# Patient Record
Sex: FEMALE | Race: WHITE | NOT HISPANIC OR LATINO | Employment: PART TIME | URBAN - NONMETROPOLITAN AREA
[De-identification: names, ages, dates, MRNs, and addresses within clinical notes are randomized per-mention and may not be internally consistent; named-entity substitution may affect disease eponyms.]

---

## 2018-02-23 ENCOUNTER — APPOINTMENT (OUTPATIENT)
Dept: LAB | Facility: HOSPITAL | Age: 58
End: 2018-02-23
Attending: EMERGENCY MEDICINE

## 2018-02-23 ENCOUNTER — HOSPITAL ENCOUNTER (OUTPATIENT)
Dept: CT IMAGING | Facility: HOSPITAL | Age: 58
Discharge: HOME | End: 2018-02-23
Admitting: EMERGENCY MEDICINE

## 2018-02-23 PROCEDURE — 70450 CT HEAD/BRAIN W/O DYE: CPT

## 2018-02-23 PROCEDURE — 80053 COMPREHEN METABOLIC PANEL: CPT | Performed by: EMERGENCY MEDICINE

## 2018-02-23 PROCEDURE — 85025 COMPLETE CBC W/AUTO DIFF WBC: CPT | Performed by: EMERGENCY MEDICINE

## 2018-10-22 ENCOUNTER — HOSPITAL ENCOUNTER (OUTPATIENT)
Facility: HOSPITAL | Age: 58
Setting detail: HOSPITAL OUTPATIENT SURGERY
Discharge: HOME | End: 2018-10-22
Attending: INTERNAL MEDICINE | Admitting: INTERNAL MEDICINE

## 2018-10-22 ENCOUNTER — ANESTHESIA (OUTPATIENT)
Dept: GASTROENTEROLOGY | Facility: HOSPITAL | Age: 58
End: 2018-10-22

## 2018-10-22 ENCOUNTER — ANESTHESIA EVENT (OUTPATIENT)
Dept: GASTROENTEROLOGY | Facility: HOSPITAL | Age: 58
End: 2018-10-22

## 2018-10-22 VITALS
TEMPERATURE: 97 F | SYSTOLIC BLOOD PRESSURE: 115 MMHG | BODY MASS INDEX: 31.78 KG/M2 | WEIGHT: 222 LBS | HEIGHT: 70 IN | DIASTOLIC BLOOD PRESSURE: 66 MMHG | RESPIRATION RATE: 20 BRPM | OXYGEN SATURATION: 94 % | HEART RATE: 68 BPM

## 2018-10-22 DIAGNOSIS — Z86.010 HISTORY OF COLON POLYPS: ICD-10-CM

## 2018-10-22 PROCEDURE — 25010000002 CEFTRIAXONE: Performed by: INTERNAL MEDICINE

## 2018-10-22 PROCEDURE — 25010000002 MIDAZOLAM PER 1 MG: Performed by: NURSE ANESTHETIST, CERTIFIED REGISTERED

## 2018-10-22 PROCEDURE — 25010000002 PROPOFOL 10 MG/ML EMULSION: Performed by: NURSE ANESTHETIST, CERTIFIED REGISTERED

## 2018-10-22 PROCEDURE — 88305 TISSUE EXAM BY PATHOLOGIST: CPT | Performed by: INTERNAL MEDICINE

## 2018-10-22 PROCEDURE — 88305 TISSUE EXAM BY PATHOLOGIST: CPT | Performed by: PATHOLOGY

## 2018-10-22 RX ORDER — GABAPENTIN 100 MG/1
100 CAPSULE ORAL 3 TIMES DAILY
COMMUNITY
End: 2022-12-09

## 2018-10-22 RX ORDER — PROMETHAZINE HYDROCHLORIDE 25 MG/ML
12.5 INJECTION, SOLUTION INTRAMUSCULAR; INTRAVENOUS ONCE AS NEEDED
Status: DISCONTINUED | OUTPATIENT
Start: 2018-10-22 | End: 2018-10-22 | Stop reason: HOSPADM

## 2018-10-22 RX ORDER — PROMETHAZINE HYDROCHLORIDE 25 MG/1
25 TABLET ORAL ONCE AS NEEDED
Status: DISCONTINUED | OUTPATIENT
Start: 2018-10-22 | End: 2018-10-22 | Stop reason: HOSPADM

## 2018-10-22 RX ORDER — MIDAZOLAM HYDROCHLORIDE 1 MG/ML
INJECTION INTRAMUSCULAR; INTRAVENOUS AS NEEDED
Status: DISCONTINUED | OUTPATIENT
Start: 2018-10-22 | End: 2018-10-22 | Stop reason: SURG

## 2018-10-22 RX ORDER — ONDANSETRON 2 MG/ML
4 INJECTION INTRAMUSCULAR; INTRAVENOUS ONCE AS NEEDED
Status: DISCONTINUED | OUTPATIENT
Start: 2018-10-22 | End: 2018-10-22 | Stop reason: HOSPADM

## 2018-10-22 RX ORDER — DEXTROSE AND SODIUM CHLORIDE 5; .45 G/100ML; G/100ML
20 INJECTION, SOLUTION INTRAVENOUS CONTINUOUS
Status: DISCONTINUED | OUTPATIENT
Start: 2018-10-22 | End: 2018-10-22 | Stop reason: HOSPADM

## 2018-10-22 RX ORDER — DEXAMETHASONE SODIUM PHOSPHATE 4 MG/ML
8 INJECTION, SOLUTION INTRA-ARTICULAR; INTRALESIONAL; INTRAMUSCULAR; INTRAVENOUS; SOFT TISSUE ONCE AS NEEDED
Status: DISCONTINUED | OUTPATIENT
Start: 2018-10-22 | End: 2018-10-22 | Stop reason: HOSPADM

## 2018-10-22 RX ORDER — ESCITALOPRAM OXALATE 10 MG/1
10 TABLET ORAL DAILY
COMMUNITY

## 2018-10-22 RX ORDER — MONTELUKAST SODIUM 10 MG/1
10 TABLET ORAL NIGHTLY
COMMUNITY
End: 2022-12-09

## 2018-10-22 RX ORDER — PROMETHAZINE HYDROCHLORIDE 25 MG/1
25 SUPPOSITORY RECTAL ONCE AS NEEDED
Status: DISCONTINUED | OUTPATIENT
Start: 2018-10-22 | End: 2018-10-22 | Stop reason: HOSPADM

## 2018-10-22 RX ORDER — PROPOFOL 10 MG/ML
VIAL (ML) INTRAVENOUS AS NEEDED
Status: DISCONTINUED | OUTPATIENT
Start: 2018-10-22 | End: 2018-10-22 | Stop reason: SURG

## 2018-10-22 RX ADMIN — PROPOFOL 50 MG: 10 INJECTION, EMULSION INTRAVENOUS at 16:03

## 2018-10-22 RX ADMIN — CEFTRIAXONE 1 G: 1 INJECTION, POWDER, FOR SOLUTION INTRAMUSCULAR; INTRAVENOUS at 15:54

## 2018-10-22 RX ADMIN — PROPOFOL 50 MG: 10 INJECTION, EMULSION INTRAVENOUS at 15:59

## 2018-10-22 RX ADMIN — PROPOFOL 50 MG: 10 INJECTION, EMULSION INTRAVENOUS at 15:53

## 2018-10-22 RX ADMIN — PROPOFOL 30 MG: 10 INJECTION, EMULSION INTRAVENOUS at 16:06

## 2018-10-22 RX ADMIN — DEXTROSE AND SODIUM CHLORIDE 20 ML/HR: 5; 450 INJECTION, SOLUTION INTRAVENOUS at 15:47

## 2018-10-22 RX ADMIN — PROPOFOL 50 MG: 10 INJECTION, EMULSION INTRAVENOUS at 15:55

## 2018-10-22 RX ADMIN — MIDAZOLAM HYDROCHLORIDE 2 MG: 2 INJECTION, SOLUTION INTRAMUSCULAR; INTRAVENOUS at 15:50

## 2018-10-22 NOTE — ANESTHESIA POSTPROCEDURE EVALUATION
Patient: Mary Alice Garcia    Procedure Summary     Date:  10/22/18 Room / Location:  [facility] ENDOSCOPY 2 / [facility] ENDOSCOPY    Anesthesia Start:  1550 Anesthesia Stop:  1609    Procedure:  COLONOSCOPY (N/A ) Diagnosis:       History of colon polyps      (History of colon polyps [Z86.010])    Surgeon:  Christiano Durbin DO Provider:  Ethel Washburn CRNA    Anesthesia Type:  MAC ASA Status:  3          Anesthesia Type: MAC  Last vitals  BP   143/76 (10/22/18 1538)   Temp   97.4 °F (36.3 °C) (10/22/18 1538)   Pulse   76 (10/22/18 1538)   Resp   20 (10/22/18 1538)     SpO2   99 % (10/22/18 1538)     Post Anesthesia Care and Evaluation    Patient location during evaluation: bedside  Patient participation: complete - patient participated  Level of consciousness: awake and awake and alert  Pain management: adequate  Airway patency: patent  Anesthetic complications: No anesthetic complications  PONV Status: none  Cardiovascular status: acceptable  Respiratory status: acceptable  Hydration status: acceptable

## 2018-10-22 NOTE — ANESTHESIA PREPROCEDURE EVALUATION
Anesthesia Evaluation     Patient summary reviewed and Nursing notes reviewed                Airway   Mallampati: III  TM distance: >3 FB  Neck ROM: full  Possible difficult intubation  Dental - normal exam     Pulmonary - negative pulmonary ROS   (+) decreased breath sounds,   Cardiovascular - negative cardio ROS and normal exam        Neuro/Psych  (+) seizures, psychiatric history Anxiety and Bipolar,     GI/Hepatic/Renal/Endo    (+) obesity,  GERD,      Musculoskeletal     Abdominal   (+) obese,    Substance History - negative use     OB/GYN negative ob/gyn ROS         Other   (+) arthritis                     Anesthesia Plan    ASA 3     MAC     Anesthetic plan, all risks, benefits, and alternatives have been provided, discussed and informed consent has been obtained with: patient.

## 2018-10-23 NOTE — H&P
Asad Hurtado DO,[facility]  Gastroenterology  Hepatology  Endoscopy  Board Certified in Internal Medicine and gastroenterology  [address], [address]  [place]. [address]  T- [phone]   F - [phone]     GASTROENTEROLOGY HISTORY AND PHYSICAL  NOTE   ASAD HURTADO DO.         SUBJECTIVE:   10/23/2018    Name: Mary Alice Garcia  DOD: 1960            Subjective : History of colon polyps.    Patient is 58 y.o. female presents with desire for elective colonoscopy.      ROS/HISTORY/ CURRENT MEDICATIONS/OBJECTIVE/VS/PE:   Review of Systems:  All systems unremarkable unless specified below.  Constitutional   HENT  Eyes   Respiratory    Cardiovascular  Gastrointestinal   Endocrine  Genitourinary    Musculoskeletal   Skin  Allergic/Immunologic    Neurological    Hematological  Psychiatric/Behavioral    History:   History reviewed. No pertinent past medical history.  Past Surgical History:   Procedure Laterality Date   •  SECTION     • COLONOSCOPY N/A 10/22/2018    Procedure: COLONOSCOPY;  Surgeon: Asad Hurtado DO;  Location: [facility] ENDOSCOPY;  Service: Gastroenterology   • JOINT REPLACEMENT Left     knee   • LAPAROSCOPIC GASTRIC BANDING     • REPLACEMENT TOTAL KNEE     • TUBAL ABDOMINAL LIGATION       History reviewed. No pertinent family history.  Social History   Substance Use Topics   • Smoking status: Not on file   • Smokeless tobacco: Not on file   • Alcohol use Not on file     No prescriptions prior to admission.     Allergies:  Patient has no known allergies.    I have reviewed the patients medical history, surgical history and family history in the available medical record system.     Current Medications:     No current facility-administered medications for this encounter.      Current Outpatient Prescriptions   Medication Sig Dispense Refill   • Cyanocobalamin (VITAMIN B-12 IJ) Inject 1,000 mcg as directed Every 28 (Twenty-Eight) Days.     • escitalopram (LEXAPRO) 10 MG  tablet Take 10 mg by mouth Daily.     • gabapentin (NEURONTIN) 100 MG capsule Take 100 mg by mouth 3 (Three) Times a Day.     • montelukast (SINGULAIR) 10 MG tablet Take 10 mg by mouth Every Night.     • aspirin 81 MG EC tablet Take 81 mg by mouth Daily.     • Cholecalciferol 5000 units tablet Take 5,000 Units by mouth.     • lamoTRIgine (LaMICtal) 200 MG tablet Take 200 mg by mouth.     • Oxybutynin Chloride (DITROPAN PO) Take 5 mg by mouth.     • pregabalin (LYRICA) 75 MG capsule      • raNITIdine (ZANTAC) 150 MG tablet Take 300 mg by mouth.         Objective     Physical Exam:   Temp:  [97 °F (36.1 °C)-97.4 °F (36.3 °C)] 97 °F (36.1 °C)  Heart Rate:  [67-76] 68  Resp:  [18-20] 20  BP: (115-143)/(66-76) 115/66    Physical Exam:  General Appearance:    Alert, cooperative, in no acute distress   Head:    Normocephalic, without obvious abnormality, atraumatic   Eyes:            Lids and lashes normal, conjunctivae and sclerae normal, no   icterus, no pallor, corneas clear, PERRLA   Ears:    Ears appear intact with no abnormalities noted   Throat:   No oral lesions, no thrush, oral mucosa moist   Neck:   No adenopathy, supple, trachea midline, no thyromegaly, no     carotid bruit, no JVD   Back:     No kyphosis present, no scoliosis present, no skin lesions,       erythema or scars, no tenderness to percussion or                   palpation,   range of motion normal   Lungs:     Clear to auscultation,respirations regular, even and                   unlabored    Heart:    Regular rhythm and normal rate, normal S1 and S2, no            murmur, no gallop, no rub, no click   Breast Exam:    Deferred   Abdomen:     Normal bowel sounds, no masses, no organomegaly, soft        non-tender, non-distended, no guarding, no rebound                 tenderness   Genitalia:    Deferred   Extremities:   Moves all extremities well, no edema, no cyanosis, no              redness   Pulses:   Pulses palpable and equal bilaterally   Skin:    No bleeding, bruising or rash   Lymph nodes:   No palpable adenopathy   Neurologic:   Cranial nerves 2 - 12 grossly intact, sensation intact, DTR        present and equal bilaterally      Results Review:     Lab Results   Component Value Date    WBC 6.06 02/23/2018    HGB 13.8 02/23/2018    HCT 41.9 02/23/2018     02/23/2018             No results found for: LIPASE  No results found for: INR       Radiology Review:  Imaging Results (last 72 hours)     ** No results found for the last 72 hours. **           I reviewed the patient's new clinical results.  I reviewed the patient's new imaging results and agree with the interpretation.     ASSESSMENT/PLAN:   ASSESSMENT:   1.  Personal history of colon polyps    PLAN:   1.  Colonoscopy    Risk and benefits associated with procedure are reviewed with the patient.  The patient wishes to proceed      Christiano Durbin DO  10/23/18  11:23 AM

## 2018-10-24 LAB
LAB AP CASE REPORT: NORMAL
PATH REPORT.FINAL DX SPEC: NORMAL
PATH REPORT.GROSS SPEC: NORMAL

## 2022-06-13 ENCOUNTER — TRANSCRIBE ORDERS (OUTPATIENT)
Dept: PHYSICAL THERAPY | Facility: HOSPITAL | Age: 62
End: 2022-06-13

## 2022-06-13 DIAGNOSIS — Z96.651 STATUS POST TOTAL KNEE REPLACEMENT, RIGHT: Primary | ICD-10-CM

## 2022-06-29 ENCOUNTER — HOSPITAL ENCOUNTER (OUTPATIENT)
Dept: PHYSICAL THERAPY | Facility: HOSPITAL | Age: 62
Setting detail: THERAPIES SERIES
Discharge: HOME | End: 2022-06-29

## 2022-06-29 DIAGNOSIS — Z96.651 STATUS POST TOTAL KNEE REPLACEMENT, RIGHT: Primary | ICD-10-CM

## 2022-06-29 PROCEDURE — 97162 PT EVAL MOD COMPLEX 30 MIN: CPT

## 2022-06-29 NOTE — THERAPY EVALUATION
Outpatient Physical Therapy Ortho Initial Evaluation  [facility]     Patient Name: Mary Alice Garcia  : 1960  MRN: [id number]  Today's Date: 2022      Visit Date: 2022   Attendance:  (Awaiting approved)  Subjective Improvement: n/a  Next MD Visit: ~ 3 weeks  Recert Date: 22    Therapy Diagnosis: S/P R TKA on 22      Patient Active Problem List   Diagnosis   • Agoraphobia   • Arthropathy, multiple sites   • Bipolar disorder (HCC)   • Depressive disorder, not elsewhere classified   • Esophageal reflux   • Generalized osteoarthritis of multiple sites   • Mood disorder (HCC)   • Obesity   • Sialoadenitis   • Thoracic or lumbosacral neuritis or radiculitis, unspecified   • Venous insufficiency   • Vitamin B 12 deficiency        History reviewed. No pertinent past medical history.     Past Surgical History:   Procedure Laterality Date   •  SECTION     • COLONOSCOPY N/A 10/22/2018    Procedure: COLONOSCOPY;  Surgeon: Christiano Durbin DO;  Location: [facility] ENDOSCOPY;  Service: Gastroenterology   • JOINT REPLACEMENT Left     knee   • LAPAROSCOPIC GASTRIC BANDING     • REPLACEMENT TOTAL KNEE     • TUBAL ABDOMINAL LIGATION       Current Outpatient Medications   Medication Instructions   • aspirin 81 mg, Oral, Daily   • Cholecalciferol 5,000 Units, Oral   • Cyanocobalamin (VITAMIN B-12 IJ) 1,000 mcg, Injection, Every 28 Days   • escitalopram (LEXAPRO) 10 mg, Oral, Daily   • gabapentin (NEURONTIN) 100 mg, Oral, 3 Times Daily   • lamoTRIgine (LAMICTAL) 200 mg, Oral   • montelukast (SINGULAIR) 10 mg, Oral, Nightly   • Oxybutynin Chloride (DITROPAN PO) 5 mg, Oral   • pregabalin (LYRICA) 75 MG capsule No dose, route, or frequency recorded.   • raNITIdine (ZANTAC) 300 mg, Oral       No Known Allergies      Visit Dx:     ICD-10-CM ICD-9-CM   1. Status post total knee replacement, right  Z96.651 V43.65          Patient History     Row Name 22 1300             History    Chief  Complaint Pain;Difficulty Walking  -      Type of Pain Knee pain  Right  -      Date Current Problem(s) Began --  2 years before surgery  -      Brief Description of Current Complaint Pt presents with her  who assisted with the subjective history. Pt was experiencing right knee pain for 2 years prior to undergoing a R TKA on 6/27/22. Pt denies any complication with surgery. Pt spent one night in the hospital and was d/c to home. Pt was previously ambulating independently prior to surgery and is now having difficulty ambulating with a walker.  female living with her spouse in a multi-level home (pt does not have any need to go up/down stairs). Pt’s bed/bath are on the main floor.  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 Hobbies: grandchildren, crafts, cooking  -      What clinical tests have you had for this problem? X-ray  -      Results of Clinical Tests Per x-ray imaging report on 6/27/22: “There are postoperative changes from total right knee arthroplasty.  All hardware appears in expected position and alignment with no periprosthetic fracture.  Soft tissue gas is expected postoperative finding.“  -              Pain     Pain Location Knee  Right  -      Pain at Present 8  -      Pain at Best 6  -      Pain at Worst 8  -      Pain Frequency Constant/continuous  -      Pain Description Burning;Aching  -      What Performance Factors Make the Current Problem(s) WORSE? weight bearing, walking, standing, knee flex/ext, general knee motions, getting in/out of a car, and stairs/curbs.  -      What Performance  Factors Make the Current Problem(s) BETTER? Medication, rest  -      Tolerance Time- Standing 2 min with walker  -      Tolerance Time- Sitting 30 min  -      Tolerance Time- Walking 2 min with walker  -      Is your sleep disturbed? Yes  -      Is medication used to assist with sleep? No  -      Difficulties at work? Not currently working  -      Difficulties with ADL's? needs assistance with all ADLs and IADLs  -      Difficulties with recreational activities? Unable  -              Fall Risk Assessment    Any falls in the past year: Yes  -      Number of falls reported in the last 12 months 1  -      Factors that contributed to the fall: Tripped  -            User Key  (r) = Recorded By, (t) = Taken By, (c) = Cosigned By    Initials Name Provider Type     Anum Wesley, PT Physical Therapist                 PT Ortho     Row Name 06/29/22 1300       Subjective Comments    Subjective Comments See therapy patient history.  -       Precautions and Contraindications    Contraindications R TKA on 6/27/22. L TKA 6 years ago.  -       Subjective Pain    Able to rate subjective pain? yes  -    Pre-Treatment Pain Level 8  -    Post-Treatment Pain Level 8  -       Posture/Observations    Posture/Observations Comments Pt arrived ambulating with FWRW and knee ext immobilization brace donned correctly. Pain pump present and intact. Ace wrap, gauze wrap, and gaze pads were all removed. Incision is covered with an island dressing that is to remain on pt. No signs of drainage. No signs of infection. Skin in normal post-op temp. Mode edema in knee and lower leg distally. Mild healing bruise on medial knee.  -       General ROM    RT Lower Ext Rt Knee Extension/Flexion  -       Right Lower Ext    Rt Knee Extension/Flexion AROM 0-2-47 deg  -       MMT (Manual Muscle Testing)    General MMT Comments Not assessed due to recent surgery and pain with ROM. At least 3/5 grossly  -        Sensation    Light Touch Partial deficits in the RLE  -       Balance Skills Training    Balance Comments Pt unable  -       Gait/Stairs (Locomotion)    [place]ette Level (Gait) modified independence  -    Assistive Device (Gait) walker, standard  -    Comment, (Gait/Stairs) Significant antalgic gait with decreased gait speed, decreased hip/ankle/knee mobility (partially limited due to brace), no HS or toe off for IC.  -          User Key  (r) = Recorded By, (t) = Taken By, (c) = Cosigned By    Initials Name Provider Type    Anum Wise, PT Physical Therapist                            Therapy Education  Education Details: HEP: QS, heel slides, hip add squeeze, HS isometrics  Given: HEP  Program: New  How Provided: Verbal, Demonstration, Written  Provided to: Patient, Caregiver  Level of Understanding: Verbalized, Demonstrated      PT OP Goals     Row Name 06/29/22 1300          PT Short Term Goals    STG Date to Achieve 07/20/22  -     STG 1 Pt’ LEFS score will improve by at least 18 points.  -     STG 1 Progress New  -     STG 2 Pt’s knee ext AROM will improve to 0 deg.  -     STG 2 Progress New  [facility]     STG 3 Pt’s knee flex AROM will improve to at least 90 deg.  -     STG 3 Progress New  [facility]     STG 4 Pt will be able to ambulate at least 600 feet with an assistive device and without gait deviations for safe community ambulation.  -     STG 4 Progress New  [facility]            Long Term Goals    LTG Date to Achieve 08/10/22  -     LTG 1 Pt will be independent with final HEP for self-management of symptoms.  -     LTG 1 Progress New  [facility]     LTG 2 Pt will report a subjective improvement of at least 75% in symptoms as a measure to return to PLOF.  -     LTG 2 Progress New  [facility]     LTG 3 Pt will be independent with all ADLs and IADLs as a measure of return to PLOF.  -     LTG 3 Progress New  [facility]     LTG 4 Pt’s knee flex AROM will improve to at least 120 deg.  -     LTG 4 Progress New   -     LTG 5 Pt’s knee flex/ext MMT will improve to 5/5 as a measure of improved strength.  -     LTG 5 Progress New  -     LTG 6 Pt will be able to ambulate at least 600 feet without an assistive device and without gait deviations for safe community ambulation.  -     LTG 6 Progress New  -            Time Calculation    PT Goal Re-Cert Due Date 07/20/22  -           User Key  (r) = Recorded By, (t) = Taken By, (c) = Cosigned By    Initials Name Provider Type     Anum Wesley, PT Physical Therapist                 PT Assessment/Plan     Row Name 06/29/22 1300          PT Assessment    Functional Limitations Impaired gait;Impaired locomotion;Limitation in home management;Performance in self-care ADL;Performance in leisure activities  -     Impairments Balance;Edema;Gait;Impaired flexibility;Impaired muscle endurance;Impaired muscle length;Impaired muscle power;Joint integrity;Joint mobility;Muscle strength;Pain;Poor body mechanics;Range of motion;Sensation  -     Assessment Comments Mrs. Garcia is a 62 y/o female who presents to physical therapy s/p R TKA that occurred on 6/27/22. Pt arrived ambulating with standard walker where she was previously independent. Pt reports currently experiencing pain/difficulty with weight bearing, walking, standing, knee flex/ext, general knee motions, getting in/out of a car, and stairs/curbs. Upon evaluation, she presents with pain, TTP, edema, decreased ROM, decreased flexibility, LE weakness, decreased balance, and gait deviations. Mrs. Garcia would benefit from skilled physical therapy to address the above deficits in order for her to return to her prior level of function and independent status.  -     Rehab Potential Good  -     Patient/caregiver participated in establishment of treatment plan and goals Yes  -     Patient would benefit from skilled therapy intervention Yes  -            PT Plan    PT Frequency 2x/week  -     Predicted Duration of  Therapy Intervention (PT) 4-6 weeks  -     Planned CPT's? PT EVAL MOD COMPLELITY: 83824;PT RE-EVAL: 84329;PT THER PROC EA 15 MIN: 16824;PT THER ACT EA 15 MIN: 82159;PT MANUAL THERAPY EA 15 MIN: 57334;PT NEUROMUSC RE-EDUCATION EA 15 MIN: 58053;PT GAIT TRAINING EA 15 MIN: 55597;PT SELF CARE/HOME MGMT/TRAIN EA 15: 84640;PT ELECTRICAL STIM UNATTEND: ;PT HOT/COLD PACK WC NONMCARE: 66899;PT THER SUPP EA 15 MIN  -     PT Plan Comments ROM, hip and knee strength, stretching, balance, gait, modalities and manual as needed.  -           User Key  (r) = Recorded By, (t) = Taken By, (c) = Cosigned By    Initials Name Provider Type     Anum Wesley, PT Physical Therapist                   OP Exercises     Row Name 06/29/22 1300             Subjective Comments    Subjective Comments See therapy patient history.  -              Subjective Pain    Able to rate subjective pain? yes  -      Pre-Treatment Pain Level 8  -      Post-Treatment Pain Level 8  -              Exercise 1    Exercise Name 1 QS  -              Exercise 2    Exercise Name 2 HS isometric  -              Exercise 3    Exercise Name 3 Hip add squeeze  -              Exercise 4    Exercise Name 4 Heel slides  -            User Key  (r) = Recorded By, (t) = Taken By, (c) = Cosigned By    Initials Name Provider Type     Anum Wesley, PT Physical Therapist                              Outcome Measure Options: Lower Extremity Functional Scale (LEFS)  Lower Extremity Functional Index  Any of your usual work, housework or school activities: Extreme difficulty or unable to perform activity  Your usual hobbies, recreational or sporting activities: Extreme difficulty or unable to perform activity  Getting into or out of the bath: Extreme difficulty or unable to perform activity  Walking between rooms: Moderate difficulty  Putting on your shoes or socks: Extreme difficulty or unable to perform activity  Squatting: Extreme difficulty or  unable to perform activity  Lifting an object, like a bag of groceries from the floor: Extreme difficulty or unable to perform activity  Performing light activities around your home: Extreme difficulty or unable to perform activity  Performing heavy activities around your home: Extreme difficulty or unable to perform activity  Getting into or out of a car: Quite a bit of difficulty  Walking 2 blocks: Quite a bit of difficulty  Walking a mile: Extreme difficulty or unable to perform activity  Going up or down 10 stairs (about 1 flight of stairs): Extreme difficulty or unable to perform activity  Standing for 1 hour: Extreme difficulty or unable to perform activity  Sitting for 1 hour: Moderate difficulty  Running on even ground: Extreme difficulty or unable to perform activity  Running on uneven ground: Extreme difficulty or unable to perform activity  Making sharp turns while running fast: Extreme difficulty or unable to perform activity  Hopping: Extreme difficulty or unable to perform activity  Rolling over in bed: Moderate difficulty  Total: 8      Time Calculation:     Start Time: 1348  Stop Time: 1438  Time Calculation (min): 50 min  Untimed Charges  PT Eval/Re-eval Minutes: 50  Total Minutes  Untimed Charges Total Minutes: 50   Total Minutes: 50     Therapy Charges for Today     Code Description Service Date Service Provider Modifiers Qty    42185353853 HC PT EVAL MOD COMPLEXITY 4 6/29/2022 Anum Wesley, PT GP 1          PT G-Codes  Outcome Measure Options: Lower Extremity Functional Scale (LEFS)  Total: 8         Anum Wesley PT, DPT  6/29/2022

## 2022-07-01 ENCOUNTER — HOSPITAL ENCOUNTER (OUTPATIENT)
Dept: PHYSICAL THERAPY | Facility: HOSPITAL | Age: 62
Setting detail: THERAPIES SERIES
Discharge: HOME | End: 2022-07-01

## 2022-07-01 DIAGNOSIS — Z96.651 STATUS POST TOTAL KNEE REPLACEMENT, RIGHT: Primary | ICD-10-CM

## 2022-07-01 PROCEDURE — G0283 ELEC STIM OTHER THAN WOUND: HCPCS

## 2022-07-01 PROCEDURE — 97110 THERAPEUTIC EXERCISES: CPT

## 2022-07-01 NOTE — THERAPY TREATMENT NOTE
Outpatient Physical Therapy Ortho Treatment Note  [facility]     Patient Name: Mary Alice Garcia  : 1960  MRN: [id number]  Today's Date: 2022      Visit Date: 2022     Attendance: 2/2 (45/yr)  Subjective Improvement: n/a  Next MD Visit: ~ 3 weeks  Recert Date: 22     Therapy Diagnosis: S/P R TKA on 22    Visit Dx:    ICD-10-CM ICD-9-CM   1. Status post total knee replacement, right  Z96.651 V43.65       Patient Active Problem List   Diagnosis   • Agoraphobia   • Arthropathy, multiple sites   • Bipolar disorder (HCC)   • Depressive disorder, not elsewhere classified   • Esophageal reflux   • Generalized osteoarthritis of multiple sites   • Mood disorder (HCC)   • Obesity   • Sialoadenitis   • Thoracic or lumbosacral neuritis or radiculitis, unspecified   • Venous insufficiency   • Vitamin B 12 deficiency        No past medical history on file.     Past Surgical History:   Procedure Laterality Date   •  SECTION     • COLONOSCOPY N/A 10/22/2018    Procedure: COLONOSCOPY;  Surgeon: Christiano Durbin DO;  Location: [facility] ENDOSCOPY;  Service: Gastroenterology   • JOINT REPLACEMENT Left     knee   • LAPAROSCOPIC GASTRIC BANDING     • REPLACEMENT TOTAL KNEE     • TUBAL ABDOMINAL LIGATION          PT Ortho     Row Name 22 1300       Precautions and Contraindications    Contraindications R TKA on 22. L TKA 6 years ago.  -KH       Subjective Pain    Pre-Treatment Pain Level 7  -KH       Posture/Observations    Posture/Observations Comments ambualation with standard walker; immoblzer and ace wrap on R knee; pain pump intact;  -KH       Right Lower Ext    Rt Knee Extension/Flexion AROM 0-1-67°  -KH          User Key  (r) = Recorded By, (t) = Taken By, (c) = Cosigned By    Initials Name Provider Type    Tonya Hatfield PTA Physical Therapist Assistant                             PT Assessment/Plan     Row Name 22 1300          PT Assessment    Functional  Limitations Impaired gait;Impaired locomotion;Limitation in home management;Performance in self-care ADL;Performance in leisure activities  -     Impairments Balance;Edema;Gait;Impaired flexibility;Impaired muscle endurance;Impaired muscle length;Impaired muscle power;Joint integrity;Joint mobility;Muscle strength;Pain;Poor body mechanics;Range of motion;Sensation  -     Assessment Comments pain pump and bandages removed ths date; no change in HEP this date and encouraged pt to continue with current HEP focusing on bend and quad strength; ok's patient to remove immoblizer when seated but to wear to bed and while up and on it for 3 wks per per report of the MD wanting it warn that long; minimal edema noted; some brusing in anterior thigh and medial knee but overall doing well for 5 days post;  -     Rehab Potential Good  -     Patient/caregiver participated in establishment of treatment plan and goals Yes  -     Patient would benefit from skilled therapy intervention Yes  -            PT Plan    PT Frequency 2x/week  -     Predicted Duration of Therapy Intervention (PT) 4-6 weeks  -     PT Plan Comments Continue with IFC as benefical; progress gait and strength to [facility] as able. Add SLR to HEP next  -           User Key  (r) = Recorded By, (t) = Taken By, (c) = Cosigned By    Initials Name Provider Type    Tonya Hatfield PTA Physical Therapist Assistant                 Modalities     Row Name 07/01/22 1300             Ice    Ice Applied Yes  -      Location R knee with IFC and elevation  -      Ice S/P Rx Yes  -              ELECTRICAL STIMULATION    Attended/Unattended Unattended  -      Stimulation Type IFC  -      Location/Electrode Placement/Other R knee with ICE and elevation  -      PT E-Stim Unattended Minutes 15  -KH            User Key  (r) = Recorded By, (t) = Taken By, (c) = Cosigned By    Initials Name Provider Type    Tonya Hatfield PTA Physical Therapist Assistant           "     OP Exercises     Row Name 07/01/22 1300             Subjective Comments    Subjective Comments Doing really well;  -              Subjective Pain    Able to rate subjective pain? yes  -      Pre-Treatment Pain Level 7  -KH      Post-Treatment Pain Level 5  -KH              Exercise 1    Exercise Name 1 quad sets  -KH      Sets 1 2  -KH      Reps 1 10  -KH      Time 1 5\" holds  -KH              Exercise 2    Exercise Name 2 removed pain pump  -KH              Exercise 3    Exercise Name 3 supine SLR w/ A  -KH      Sets 3 1  -KH      Reps 3 10  -KH              Exercise 4    Exercise Name 4 SL hip abd w/ A  -KH      Sets 4 1  -KH      Reps 4 10  -KH              Exercise 5    Exercise Name 5 HC/Hamstring stretch w/ strap  -KH      Sets 5 3  -KH      Time 5 30\" holds  -KH              Exercise 6    Exercise Name 6 heel slides w/ green strap  -KH      Sets 6 2  -KH      Reps 6 10  -KH              Exercise 7    Exercise Name 7 saq from side of table  -KH      Sets 7 2  -KH      Reps 7 10  -KH              Exercise 8    Exercise Name 8 seated CR  -KH      Sets 8 2  -KH      Reps 8 10  -KH            User Key  (r) = Recorded By, (t) = Taken By, (c) = Cosigned By    Initials Name Provider Type    Tonya Hatfield, SUHAS Physical Therapist Assistant                              PT OP Goals     Row Name 07/01/22 1300          PT Short Term Goals    STG Date to Achieve 07/20/22  -     STG 1 Pt’ LEFS score will improve by at least 18 points.  -     STG 2 Pt’s knee ext AROM will improve to 0 deg.  -     STG 3 Pt’s knee flex AROM will improve to at least 90 deg.  -     STG 4 Pt will be able to ambulate at least 600 feet with an assistive device and without gait deviations for safe community ambulation.  -            Long Term Goals    LTG Date to Achieve 08/10/22  -     LTG 1 Pt will be independent with final HEP for self-management of symptoms.  -     LTG 2 Pt will report a subjective improvement of at least " 75% in symptoms as a measure to return to PLOF.  -     LTG 3 Pt will be independent with all ADLs and IADLs as a measure of return to PLOF.  -     LTG 4 Pt’s knee flex AROM will improve to at least 120 deg.  -     LTG 5 Pt’s knee flex/ext MMT will improve to 5/5 as a measure of improved strength.  -     LTG 6 Pt will be able to ambulate at least 600 feet without an assistive device and without gait deviations for safe community ambulation.  -            Time Calculation    PT Goal Re-Cert Due Date 07/20/22  -           User Key  (r) = Recorded By, (t) = Taken By, (c) = Cosigned By    Initials Name Provider Type    Tonya Hatfield PTA Physical Therapist Assistant                               Time Calculation:   Start Time: 1345  Stop Time: 1451  Time Calculation (min): 66 min  Untimed Charges  PT E-Stim Unattended Minutes: 15  Total Minutes  Untimed Charges Total Minutes: 15   Total Minutes: 15  Therapy Charges for Today     Code Description Service Date Service Provider Modifiers Qty    75420038391 HC PT THER SUPP EA 15 MIN 7/1/2022 Tonya Castañeda PTA GP 1    85277096707 HC PT ELECTRICAL STIM UNATTENDED 7/1/2022 Tonya Castañeda PTA CQ 1    67556826927 HC PT THER PROC EA 15 MIN 7/1/2022 Tonya Castañeda PTA GP, CQ 3                    Tonya Castañeda PTA  7/1/2022

## 2022-07-05 ENCOUNTER — HOSPITAL ENCOUNTER (OUTPATIENT)
Dept: PHYSICAL THERAPY | Facility: HOSPITAL | Age: 62
Setting detail: THERAPIES SERIES
Discharge: HOME | End: 2022-07-05

## 2022-07-05 DIAGNOSIS — Z96.651 STATUS POST TOTAL KNEE REPLACEMENT, RIGHT: Primary | ICD-10-CM

## 2022-07-05 PROCEDURE — 97110 THERAPEUTIC EXERCISES: CPT

## 2022-07-05 PROCEDURE — G0283 ELEC STIM OTHER THAN WOUND: HCPCS

## 2022-07-05 NOTE — THERAPY TREATMENT NOTE
Outpatient Physical Therapy Ortho Treatment Note  [facility]     Patient Name: Mary Alice aGrcia  : 1960  MRN: [id number]  Today's Date: 2022      Visit Date: 2022  Attendance: 3/3 (45/yr)  Subjective Improvement: some  Next MD Visit: ~ 3 weeks  Recert Date: 22     Therapy Diagnosis: S/P R TKA on 22    Visit Dx:    ICD-10-CM ICD-9-CM   1. Status post total knee replacement, right  Z96.651 V43.65       Patient Active Problem List   Diagnosis   • Agoraphobia   • Arthropathy, multiple sites   • Bipolar disorder (HCC)   • Depressive disorder, not elsewhere classified   • Esophageal reflux   • Generalized osteoarthritis of multiple sites   • Mood disorder (HCC)   • Obesity   • Sialoadenitis   • Thoracic or lumbosacral neuritis or radiculitis, unspecified   • Venous insufficiency   • Vitamin B 12 deficiency        History reviewed. No pertinent past medical history.     Past Surgical History:   Procedure Laterality Date   •  SECTION     • COLONOSCOPY N/A 10/22/2018    Procedure: COLONOSCOPY;  Surgeon: Christiano Durbin DO;  Location: [facility] ENDOSCOPY;  Service: Gastroenterology   • JOINT REPLACEMENT Left     knee   • LAPAROSCOPIC GASTRIC BANDING     • REPLACEMENT TOTAL KNEE     • TUBAL ABDOMINAL LIGATION          PT Ortho     Row Name 22 1600       Subjective Comments    Subjective Comments Pt stated that she is under the impression that she is supposed to be in her ACE wrap, knee ext brace, and gauze wrap at all times for 3 weeks. PT instructed pt that PT cannot be completed if pt keeps brace on. PT also educated pt that her MD would not have sent a referral for physical therapy if he did not want her bending and working on ROM and strength. PT called referring provider office and left a message on clarification on pt's limitations.  -       Precautions and Contraindications    Contraindications R TKA on 22. L TKA 6 years ago.  -       Subjective Pain     Able to rate subjective pain? yes  -    Pre-Treatment Pain Level 8  -    Post-Treatment Pain Level 6  -       Posture/Observations    Posture/Observations Comments Pt arrived ambulating with FWRW with knee immobilizer brace donned. Pt wearing flip flops this date. Limited standing exercises were performed for safety.  -       Gait/Stairs (Locomotion)    [place] Level (Gait) modified independence  -    Assistive Device (Gait) walker, standard  -          User Key  (r) = Recorded By, (t) = Taken By, (c) = Cosigned By    Initials Name Provider Type     Anum Wesley, PT Physical Therapist                             PT Assessment/Plan     Row Name 07/05/22 1600          PT Assessment    Functional Limitations Impaired gait;Impaired locomotion;Limitation in home management;Performance in self-care ADL;Performance in leisure activities  -     Impairments Balance;Edema;Gait;Impaired flexibility;Impaired muscle endurance;Impaired muscle length;Impaired muscle power;Joint integrity;Joint mobility;Muscle strength;Pain;Poor body mechanics;Range of motion;Sensation  -     Assessment Comments Pt arrived with confusion as to what she is supposed to wear and what she is able to do with her knee. Pt was under the impression that she is to keep the gauze wrap, ACE wrap, and knee immobilizer at all times. PT made a phone call to the referring provider’s office who stated that pt is only to wear the island dressing and the knee immobilizer. Knee immobilizer can be doffered during therapy but is to be worn while sleeping and with WB activity. PT educated pt and caregiver on this who voiced understanding. Cont with LE strength and ROM this date. WB exercises were limited due to pt wearing flip flops to therapy. Pt instructed to try to wear sneakers next visit. AA SLR flex was added to pt’s HEP.  -     Rehab Potential Good  -     Patient/caregiver participated in establishment of treatment plan and goals Yes   -     Patient would benefit from skilled therapy intervention Yes  -            PT Plan    PT Frequency 2x/week  -     Predicted Duration of Therapy Intervention (PT) 4-6 weeks  -     PT Plan Comments Work on gait next if pt wears appropriate shoes.  -           User Key  (r) = Recorded By, (t) = Taken By, (c) = Cosigned By    Initials Name Provider Type     Anum Wesley, PT Physical Therapist                 Modalities     Row Name 07/05/22 1600             Ice    Ice Applied Yes  -      Location R knee with IFC and elevation  -      Ice S/P Rx Yes  -              ELECTRICAL STIMULATION    Attended/Unattended Unattended  -      Stimulation Type IFC  -      Location/Electrode Placement/Other R knee with ICE and elevation  -            User Key  (r) = Recorded By, (t) = Taken By, (c) = Cosigned By    Initials Name Provider Type     Anum Wesley, PT Physical Therapist               OP Exercises     Row Name 07/05/22 1600             Subjective Comments    Subjective Comments Pt stated that she is under the impression that she is supposed to be in her ACE wrap, knee ext brace, and gauze wrap at all times for 3 weeks. PT instructed pt that PT cannot be completed if pt keeps brace on. PT also educated pt that her MD would not have sent a referral for physical therapy if he did not want her bending and working on ROM and strength. PT called referring provider office and left a message on clarification on pt's limitations.  -              Subjective Pain    Able to rate subjective pain? yes  -      Pre-Treatment Pain Level 8  -      Post-Treatment Pain Level 6  -              Total Minutes    94395 - PT Therapeutic Exercise Minutes 45  -MH              Exercise 1    Exercise Name 1 Pro II rocking  -      Time 1 10 min  -      Additional Comments no resistance. Seat 12  -              Exercise 2    Exercise Name 2 MD phone call to discus pt limitations. Pt educations on what MD  said.  -      Time 2 5 min  -              Exercise 3    Exercise Name 3 QS with heel prop  -MH      Sets 3 2  -MH      Reps 3 10  -MH      Time 3 5 sec hold  -MH              Exercise 4    Exercise Name 4 SLR flex  -MH      Sets 4 1  -MH      Reps 4 15  -MH              Exercise 5    Exercise Name 5 Heel slides  -MH      Sets 5 2  -MH      Reps 5 10  -MH              Exercise 6    Exercise Name 6 HC/Hamstring stretch w/ strap  -MH      Sets 6 3  -MH      Time 6 30 sec hold  -MH              Exercise 7    Exercise Name 7 SAQ with small bolder  -MH      Sets 7 2  -MH      Reps 7 10  -MH      Additional Comments no hold. Focus on controlled movement.  -              Exercise 8    Exercise Name 8 Standing on LLE. Hip 3-way with R LE  -MH      Sets 8 2  -MH      Reps 8 10  -MH      Additional Comments UE assist  -MH              Exercise 9    Exercise Name 9 Standing on LLE, marching on RLE  -MH      Sets 9 2  -MH      Reps 9 10  -MH      Additional Comments UE assist  -MH              Exercise 10    Exercise Name 10 IFC with ice and elevation  -            User Key  (r) = Recorded By, (t) = Taken By, (c) = Cosigned By    Initials Name Provider Type     Anum Wesley, PT Physical Therapist                              PT OP Goals     Row Name 07/05/22 1600          PT Short Term Goals    STG Date to Achieve 07/20/22  -     STG 1 Pt’ LEFS score will improve by at least 18 points.  -     STG 2 Pt’s knee ext AROM will improve to 0 deg.  -     STG 3 Pt’s knee flex AROM will improve to at least 90 deg.  -     STG 4 Pt will be able to ambulate at least 600 feet with an assistive device and without gait deviations for safe community ambulation.  -            Long Term Goals    LTG Date to Achieve 08/10/22  -     LTG 1 Pt will be independent with final HEP for self-management of symptoms.  -     LTG 2 Pt will report a subjective improvement of at least 75% in symptoms as a measure to return to PLOF.   -     LTG 3 Pt will be independent with all ADLs and IADLs as a measure of return to PLOF.  -     LTG 4 Pt’s knee flex AROM will improve to at least 120 deg.  -     LTG 5 Pt’s knee flex/ext MMT will improve to 5/5 as a measure of improved strength.  -     LTG 6 Pt will be able to ambulate at least 600 feet without an assistive device and without gait deviations for safe community ambulation.  -            Time Calculation    PT Goal Re-Cert Due Date 07/20/22  -           User Key  (r) = Recorded By, (t) = Taken By, (c) = Cosigned By    Initials Name Provider Type     Anum Wesley PT Physical Therapist                Therapy Education  Education Details: HEP: AA SLR flex  Given: HEP  Program: New  How Provided: Verbal  Provided to: Patient, Caregiver  Level of Understanding: Verbalized              Time Calculation:   Start Time: 1554  Stop Time: 1654  Time Calculation (min): 60 min  Timed Charges  53621 - PT Therapeutic Exercise Minutes: 45  Untimed Charges  PT E-Stim Unattended Minutes: 15  Total Minutes  Timed Charges Total Minutes: 45  Untimed Charges Total Minutes: 15   Total Minutes: 60  Therapy Charges for Today     Code Description Service Date Service Provider Modifiers Qty    88259118408 HC PT THER PROC EA 15 MIN 7/5/2022 Anum Wesley PT GP 3    21226419225 HC PT ELECTRICAL STIM UNATTENDED 7/5/2022 Anum Wesley PT  1                    Anum Wesley PT, DPT  7/5/2022

## 2022-07-08 ENCOUNTER — HOSPITAL ENCOUNTER (OUTPATIENT)
Dept: PHYSICAL THERAPY | Facility: HOSPITAL | Age: 62
Setting detail: THERAPIES SERIES
Discharge: HOME | End: 2022-07-08

## 2022-07-08 DIAGNOSIS — Z96.651 STATUS POST TOTAL KNEE REPLACEMENT, RIGHT: Primary | ICD-10-CM

## 2022-07-08 PROCEDURE — G0283 ELEC STIM OTHER THAN WOUND: HCPCS

## 2022-07-08 PROCEDURE — 97110 THERAPEUTIC EXERCISES: CPT

## 2022-07-08 NOTE — THERAPY TREATMENT NOTE
Outpatient Physical Therapy Ortho Treatment Note  [facility]     Patient Name: Mary Alice Garcia  : 1960  MRN: [id number]  Today's Date: 2022      Visit Date: 2022   Attendance:  of 45  Subjective improvement:50%  Recert: 22  MD Appointment: 22      Visit Dx:    ICD-10-CM ICD-9-CM   1. Status post total knee replacement, right  Z96.651 V43.65       Patient Active Problem List   Diagnosis   • Agoraphobia   • Arthropathy, multiple sites   • Bipolar disorder (HCC)   • Depressive disorder, not elsewhere classified   • Esophageal reflux   • Generalized osteoarthritis of multiple sites   • Mood disorder (HCC)   • Obesity   • Sialoadenitis   • Thoracic or lumbosacral neuritis or radiculitis, unspecified   • Venous insufficiency   • Vitamin B 12 deficiency        No past medical history on file.     Past Surgical History:   Procedure Laterality Date   •  SECTION     • COLONOSCOPY N/A 10/22/2018    Procedure: COLONOSCOPY;  Surgeon: Christiano Durbin DO;  Location: [facility] ENDOSCOPY;  Service: Gastroenterology   • JOINT REPLACEMENT Left     knee   • LAPAROSCOPIC GASTRIC BANDING     • REPLACEMENT TOTAL KNEE     • TUBAL ABDOMINAL LIGATION          PT Ortho     Row Name 22 1400       Precautions and Contraindications    Contraindications R TKA on 22. L TKA 6 years ago.  -EM       Posture/Observations    Posture/Observations Comments Pt amb wiht FWRW with immobilizer on. Dressing intact. Mod edema present.  -EM       Right Lower Ext    Rt Knee Extension/Flexion AROM 3-72 AAROM supine  -EM          User Key  (r) = Recorded By, (t) = Taken By, (c) = Cosigned By    Initials Name Provider Type    EM Sarkis Stephenson, PTA Physical Therapist Assistant                             PT Assessment/Plan     Row Name 22 1500          PT Assessment    Assessment Comments Pt pooja tx well with progressed therex regime. Slightly improved knee flexion this date. Pt req cueing to keep  "from flexing knee with ER.  -EM            PT Plan    PT Frequency 2x/week  -EM     Predicted Duration of Therapy Intervention (PT) 4-6 weeks  -EM     PT Plan Comments Cont to focus on improving ROM.  -EM           User Key  (r) = Recorded By, (t) = Taken By, (c) = Cosigned By    Initials Name Provider Type    EM Sarkis Stephenson, SUHAS Physical Therapist Assistant                 Modalities     Row Name 07/08/22 1400             Subjective Pain    Post-Treatment Pain Level 5  -EM            User Key  (r) = Recorded By, (t) = Taken By, (c) = Cosigned By    Initials Name Provider Type    EM Sarkis Stephenson, PTA Physical Therapist Assistant               OP Exercises     Row Name 07/08/22 1400             Subjective Comments    Subjective Comments Pt reports no new changes. States she goes to MD 7/13  -EM              Subjective Pain    Able to rate subjective pain? yes  -EM      Pre-Treatment Pain Level 6  -EM      Post-Treatment Pain Level 5  -EM              Exercise 1    Exercise Name 1 PRO II-Rocking  -EM      Time 1 10'  -EM              Exercise 2    Exercise Name 2 Incline Calf S  -EM      Sets 2 3  -EM      Time 2 30\"  -EM              Exercise 3    Exercise Name 3 St Ham S  -EM      Sets 3 3  -EM      Time 3 30\"  -EM              Exercise 4    Exercise Name 4 LAQ  -EM      Sets 4 1  -EM      Reps 4 20  -EM              Exercise 5    Exercise Name 5 Seated Hip Flexion  -EM      Sets 5 1  -EM      Reps 5 20  -EM              Exercise 6    Exercise Name 6 Sup SLR: AAROM->AROM  -EM      Sets 6 1  -EM      Reps 6 20  -EM              Exercise 7    Exercise Name 7 QS  -EM      Sets 7 1  -EM      Reps 7 20  -EM              Exercise 8    Exercise Name 8 St Knee Flexion S  -EM      Sets 8 3  -EM      Time 8 30\"  -EM              Exercise 9    Exercise Name 9 HS with strap  -EM      Sets 9 1  -EM      Reps 9 20  -EM              Exercise 10    Exercise Name 10 Man Knee Flexion S  -EM      Time 10 5'  -EM              " Exercise 11    Exercise Name 11 IFC w/ ice/elev  -EM      Time 11 15'  -EM            User Key  (r) = Recorded By, (t) = Taken By, (c) = Cosigned By    Initials Name Provider Type    Sarkis Choudhury PTA Physical Therapist Assistant                              PT OP Goals     Row Name 07/08/22 1500          PT Short Term Goals    STG Date to Achieve 07/20/22  -EM     STG 1 Pt’ LEFS score will improve by at least 18 points.  -EM     STG 1 Progress Not Met  -EM     STG 2 Pt’s knee ext AROM will improve to 0 deg.  -EM     STG 2 Progress Not Met  -EM     STG 3 Pt’s knee flex AROM will improve to at least 90 deg.  -EM     STG 3 Progress Not Met  -EM     STG 4 Pt will be able to ambulate at least 600 feet with an assistive device and without gait deviations for safe community ambulation.  -EM     STG 4 Progress Not Met  -EM            Long Term Goals    LTG Date to Achieve 08/10/22  -EM     LTG 1 Pt will be independent with final HEP for self-management of symptoms.  -EM     LTG 1 Progress Not Met  -EM     LTG 2 Pt will report a subjective improvement of at least 75% in symptoms as a measure to return to PLOF.  -EM     LTG 2 Progress Not Met  -EM     LTG 3 Pt will be independent with all ADLs and IADLs as a measure of return to PLOF.  -EM     LTG 3 Progress Not Met  -EM     LTG 4 Pt’s knee flex AROM will improve to at least 120 deg.  -EM     LTG 4 Progress Not Met  -EM     LTG 5 Pt’s knee flex/ext MMT will improve to 5/5 as a measure of improved strength.  -EM     LTG 5 Progress Not Met  -EM     LTG 6 Pt will be able to ambulate at least 600 feet without an assistive device and without gait deviations for safe community ambulation.  -EM     LTG 6 Progress Not Met  -EM            Time Calculation    PT Goal Re-Cert Due Date 07/20/22  -EM           User Key  (r) = Recorded By, (t) = Taken By, (c) = Cosigned By    Initials Name Provider Type    Sarkis Choudhury PTA Physical Therapist Assistant                                Time Calculation:   Start Time: 1438  Stop Time: 1547  Time Calculation (min): 69 min  Total Timed Code Minutes- PT: 69 minute(s)  Therapy Charges for Today     Code Description Service Date Service Provider Modifiers Qty    71153525278 HC PT THER PROC EA 15 MIN 7/8/2022 Sarkis Stephenson, SUHAS GP, CQ 4    74395658589 HC PT ELECTRICAL STIM UNATTENDED 7/8/2022 Sarkis Stephenson PTA CQ 1                    Sarkis Stephenson PTA  7/8/2022

## 2022-07-12 ENCOUNTER — HOSPITAL ENCOUNTER (OUTPATIENT)
Dept: PHYSICAL THERAPY | Facility: HOSPITAL | Age: 62
Setting detail: THERAPIES SERIES
Discharge: HOME | End: 2022-07-12

## 2022-07-12 DIAGNOSIS — Z96.651 STATUS POST TOTAL KNEE REPLACEMENT, RIGHT: Primary | ICD-10-CM

## 2022-07-12 PROCEDURE — 97110 THERAPEUTIC EXERCISES: CPT

## 2022-07-12 PROCEDURE — G0283 ELEC STIM OTHER THAN WOUND: HCPCS

## 2022-07-12 NOTE — THERAPY TREATMENT NOTE
Outpatient Physical Therapy Ortho Treatment Note  [facility]     Patient Name: Mary Alice Garcia  : 1960  MRN: [id number]  Today's Date: 2022      Visit Date: 2022     Subjective Improvement 50%  Visits 5/5  Visits approved 45  RTMD 2022 MD note faxed  Recert Date 2022    S/P R TKA on 2022    Visit Dx:    ICD-10-CM ICD-9-CM   1. Status post total knee replacement, right  Z96.651 V43.65       Patient Active Problem List   Diagnosis   • Agoraphobia   • Arthropathy, multiple sites   • Bipolar disorder (HCC)   • Depressive disorder, not elsewhere classified   • Esophageal reflux   • Generalized osteoarthritis of multiple sites   • Mood disorder (HCC)   • Obesity   • Sialoadenitis   • Thoracic or lumbosacral neuritis or radiculitis, unspecified   • Venous insufficiency   • Vitamin B 12 deficiency        No past medical history on file.     Past Surgical History:   Procedure Laterality Date   •  SECTION     • COLONOSCOPY N/A 10/22/2018    Procedure: COLONOSCOPY;  Surgeon: Christiano Durbin DO;  Location: [facility] ENDOSCOPY;  Service: Gastroenterology   • JOINT REPLACEMENT Left     knee   • LAPAROSCOPIC GASTRIC BANDING     • REPLACEMENT TOTAL KNEE     • TUBAL ABDOMINAL LIGATION          PT Ortho     Row Name 22 1500       Subjective Comments    Subjective Comments Melva states that she is doing well so far  -CP       Precautions and Contraindications    Precautions S/P R TKA on 2022  -CP       Subjective Pain    Able to rate subjective pain? yes  -CP    Pre-Treatment Pain Level 5  -CP    Post-Treatment Pain Level 5  -CP       Posture/Observations    Posture/Observations Comments amb with w/w  -CP       Right Lower Ext    Rt Knee Extension/Flexion AROM 0-03-83  -CP          User Key  (r) = Recorded By, (t) = Taken By, (c) = Cosigned By    Initials Name Provider Type    Ethel Cat, PTA Physical Therapist Assistant                             PT  "Assessment/Plan     Row Name 07/12/22 1650          PT Assessment    Assessment Comments Very good toleance to ther ex.  All HEP were reviewed with patient.  She did have increase AROM for knee fl  -CP            PT Plan    PT Frequency 2x/week  -CP     Predicted Duration of Therapy Intervention (PT) 4-6 weeks  -CP     PT Plan Comments Cont with POC.  lat step up 4\"  -CP           User Key  (r) = Recorded By, (t) = Taken By, (c) = Cosigned By    Initials Name Provider Type    CP Ethel Carlisle PTA Physical Therapist Assistant                 Modalities     Row Name 07/12/22 1500             Ice    Ice Applied Yes  -CP      Location right knee with IFC  -CP      PT Ice Rx Minutes 15  -CP      Ice S/P Rx Yes  -CP              ELECTRICAL STIMULATION    Attended/Unattended Unattended  -CP      Stimulation Type IFC  -CP      Location/Electrode Placement/Other right knee with ice  -CP      PT E-Stim Unattended Minutes 15  -CP            User Key  (r) = Recorded By, (t) = Taken By, (c) = Cosigned By    Initials Name Provider Type    Ethel Cat PTA Physical Therapist Assistant               OP Exercises     Row Name 07/12/22 1651 07/12/22 1500          Subjective Comments    Subjective Comments -- Melva states that she is doing well so far  -CP            Subjective Pain    Able to rate subjective pain? -- yes  -CP     Pre-Treatment Pain Level -- 5  -CP     Post-Treatment Pain Level -- 5  -CP            Total Minutes    66833 - PT Therapeutic Exercise Minutes 45  -CP --            Exercise 1    Exercise Name 1 -- Pro II level 1 rocking  -CP     Time 1 -- 12  -CP     Additional Comments -- seat 12  -CP            Exercise 2    Exercise Name 2 -- incline stretch  -CP     Cueing 2 -- Verbal;Demo  -CP     Sets 2 -- 3  -CP     Reps 2 -- 30\"  -CP            Exercise 3    Exercise Name 3 -- step up 4\"  -CP     Cueing 3 -- Verbal;Demo  -CP     Sets 3 -- 2  -CP     Reps 3 -- 10  -CP            Exercise 4    Exercise " "Name 4 -- CR/TR  -CP     Cueing 4 -- Verbal;Demo  -CP     Reps 4 -- 20  -CP            Exercise 5    Exercise Name 5 -- mini squats  -CP     Cueing 5 -- Verbal;Demo  -CP     Sets 5 -- 1  -CP     Reps 5 -- 10  -CP            Exercise 6    Exercise Name 6 -- marching with w/w/  -CP     Cueing 6 -- Verbal;Demo  -CP     Reps 6 -- 60 ft  -CP            Exercise 7    Exercise Name 7 -- seated LLPS knee fl  -CP     Cueing 7 -- Verbal;Demo  -CP     Sets 7 -- 2  -CP     Reps 7 -- 10  -CP     Time 7 -- 5\" holds  -CP            Exercise 8    Exercise Name 8 -- heelslides with strap  -CP     Cueing 8 -- Verbal;Tactile  -CP     Reps 8 -- 20  -CP            Exercise 9    Exercise Name 9 -- QS with heel prop  -CP     Cueing 9 -- Verbal;Tactile  -CP     Sets 9 -- 2  -CP     Reps 9 -- 10  -CP     Time 9 -- 5\" holds  -CP           User Key  (r) = Recorded By, (t) = Taken By, (c) = Cosigned By    Initials Name Provider Type    Ethel Cat, PTA Physical Therapist Assistant                              PT OP Goals     Row Name 07/12/22 1600          PT Short Term Goals    STG Date to Achieve 07/20/22  -CP     STG 1 Pt’ LEFS score will improve by at least 18 points.  -CP     STG 1 Progress Not Met  -CP     STG 2 Pt’s knee ext AROM will improve to 0 deg.  -CP     STG 2 Progress Not Met  -CP     STG 3 Pt’s knee flex AROM will improve to at least 90 deg.  -CP     STG 3 Progress Not Met  -CP     STG 4 Pt will be able to ambulate at least 600 feet with an assistive device and without gait deviations for safe community ambulation.  -CP     STG 4 Progress Not Met  -CP            Long Term Goals    LTG Date to Achieve 08/10/22  -CP     LTG 1 Pt will be independent with final HEP for self-management of symptoms.  -CP     LTG 1 Progress Not Met  -CP     LTG 2 Pt will report a subjective improvement of at least 75% in symptoms as a measure to return to PLOF.  -CP     LTG 2 Progress Not Met  -CP     LTG 3 Pt will be independent with all " ADLs and IADLs as a measure of return to PLOF.  -CP     LTG 3 Progress Not Met  -CP     LTG 4 Pt’s knee flex AROM will improve to at least 120 deg.  -CP     LTG 4 Progress Not Met  -CP     LTG 5 Pt’s knee flex/ext MMT will improve to 5/5 as a measure of improved strength.  -CP     LTG 5 Progress Not Met  -CP     LTG 6 Pt will be able to ambulate at least 600 feet without an assistive device and without gait deviations for safe community ambulation.  -CP     LTG 6 Progress Not Met  -CP            Time Calculation    PT Goal Re-Cert Due Date 07/20/22  -CP           User Key  (r) = Recorded By, (t) = Taken By, (c) = Cosigned By    Initials Name Provider Type    CP Ethel Carlisle PTA Physical Therapist Assistant                Therapy Education  Education Details: CR/TR, marching with w/w, seated knee fl stretch, heelslides with strap, SAQ, QS with heel prop  Given: HEP  Program: New  How Provided: Verbal, Demonstration, Written  Provided to: Patient  Level of Understanding: Teach back education performed, Verbalized, Demonstrated              Time Calculation:   Start Time: 1515  Stop Time: 1625  Time Calculation (min): 70 min  Total Timed Code Minutes- PT: 45 minute(s)  Timed Charges  84906 - PT Therapeutic Exercise Minutes: 45  Untimed Charges  PT E-Stim Unattended Minutes: 15  PT Ice Rx Minutes: 15  Total Minutes  Timed Charges Total Minutes: 45  Untimed Charges Total Minutes: 30   Total Minutes: 45  Therapy Charges for Today     Code Description Service Date Service Provider Modifiers Qty    04284742881 HC PT ELECTRICAL STIM UNATTENDED 7/12/2022 Ethel Carlisle PTA CQ 1    44064901933 HC PT THER PROC EA 15 MIN 7/12/2022 Ethel Carlisle PTA GP, CQ 3                    Ethel Carlisle PTA  7/12/2022

## 2022-07-15 ENCOUNTER — HOSPITAL ENCOUNTER (OUTPATIENT)
Dept: PHYSICAL THERAPY | Facility: HOSPITAL | Age: 62
Setting detail: THERAPIES SERIES
Discharge: HOME | End: 2022-07-15

## 2022-07-15 DIAGNOSIS — Z96.651 STATUS POST TOTAL KNEE REPLACEMENT, RIGHT: Primary | ICD-10-CM

## 2022-07-15 PROCEDURE — 97110 THERAPEUTIC EXERCISES: CPT | Performed by: PHYSICAL THERAPIST

## 2022-07-15 PROCEDURE — 97140 MANUAL THERAPY 1/> REGIONS: CPT | Performed by: PHYSICAL THERAPIST

## 2022-07-15 PROCEDURE — 97530 THERAPEUTIC ACTIVITIES: CPT | Performed by: PHYSICAL THERAPIST

## 2022-07-15 NOTE — THERAPY TREATMENT NOTE
Outpatient Physical Therapy Ortho Treatment Note  [facility]     Patient Name: Mary Alice Garcia  : 1960  MRN: [id number]  Today's Date: 7/15/2022      Visit Date: 07/15/2022  Subjective Improvement 50%  Visits 6/  Visits approved 45  RTMD 2022 MD note faxed  Recert Date 2022     S/P R TKA on 2022    ICD-10-CM ICD-9-CM   1. Status post total knee replacement, right  Z96.651 V43.65       Patient Active Problem List   Diagnosis   • Agoraphobia   • Arthropathy, multiple sites   • Bipolar disorder (HCC)   • Depressive disorder, not elsewhere classified   • Esophageal reflux   • Generalized osteoarthritis of multiple sites   • Mood disorder (HCC)   • Obesity   • Sialoadenitis   • Thoracic or lumbosacral neuritis or radiculitis, unspecified   • Venous insufficiency   • Vitamin B 12 deficiency        No past medical history on file.     Past Surgical History:   Procedure Laterality Date   •  SECTION     • COLONOSCOPY N/A 10/22/2018    Procedure: COLONOSCOPY;  Surgeon: Christiano Durbin DO;  Location: [facility] ENDOSCOPY;  Service: Gastroenterology   • JOINT REPLACEMENT Left     knee   • LAPAROSCOPIC GASTRIC BANDING     • REPLACEMENT TOTAL KNEE     • TUBAL ABDOMINAL LIGATION          PT Ortho     Row Name 07/15/22 1400       Subjective Comments    Subjective Comments Pain has been greater over past few days.  -SW       Subjective Pain    Able to rate subjective pain? yes  -SW    Pre-Treatment Pain Level 7  -SW       Right Lower Ext    Rt Knee Extension/Flexion PROM -1-80  -SW          User Key  (r) = Recorded By, (t) = Taken By, (c) = Cosigned By    Initials Name Provider Type    Jessica Pham Physical Therapist                             PT Assessment/Plan     Row Name 07/15/22 1400          PT Assessment    Assessment Comments Patient progressing well with strength and gait. She is slightly behind with  knee flexion ROM. She has been instructed to focus on this at home. She was  "able to ambulate safely with SPC with verbal cueing. She was advised to focus on knee flexion at toe off. She can begin to transition to cane at home. She still needs to use walker when going out. She only had a half coke before coming  to therapy and no food. She had some dizziness after working on knee bending. She was advised to eat and drink before therapy.  -SW            PT Plan    PT Frequency 2x/week  -SW     Predicted Duration of Therapy Intervention (PT) 6 weeks  -SW     PT Plan Comments Focus on right knee flexion and extension ROM, transitioning to cane with good mechanics, and strengthenging right LE.  -           User Key  (r) = Recorded By, (t) = Taken By, (c) = Cosigned By    Initials Name Provider Type    Jessica Pham Physical Therapist                   OP Exercises     Row Name 07/15/22 1400             Subjective Comments    Subjective Comments Pain has been greater over past few days.  -SW              Subjective Pain    Able to rate subjective pain? yes  -SW      Pre-Treatment Pain Level 7  -SW              Exercise 1    Exercise Name 1 Prol Level II L1  -SW      Time 1 10'  -SW      Additional Comments for ROM  -SW              Exercise 2    Exercise Name 2 gait training with rolling walker  -SW      Reps 2 270'  -SW      Time 2 5'  -SW      Additional Comments focus on knee flexion with toe off  -SW              Exercise 3    Exercise Name 3 step ups 6\"  -SW      Sets 3 2  -SW      Reps 3 10  -SW              Exercise 4    Exercise Name 4 side step up 6\"  -SW      Reps 4 [address]  -SW              Exercise 5    Exercise Name 5 marching in standing  -SW      Reps 5 20  -SW              Exercise 6    Exercise Name 6 gait traing with cane  -SW      Time 6 140'  -SW      Additional Comments 3'  -SW              Exercise 7    Exercise Name 7 long sitting hamstring stretch with roll under heel and overpressure above knee  -SW      Reps 7 30\"x3  -SW              Exercise 8    Exercise Name 8 " "heel slides with strap in sitting  -      Reps 8 10\"x10  -SW              Exercise 9    Exercise Name 9 SLR flexion  -      Reps 9 20  -SW              Exercise 10    Exercise Name 10 STM  -SW      Time 10 15'  -SW              Exercise 11    Exercise Name 11 Ice  -SW      Time 11 10'  -SW            User Key  (r) = Recorded By, (t) = Taken By, (c) = Cosigned By    Initials Name Provider Type    SW Jessica Parikh Physical Therapist                              PT OP Goals     Row Name 07/15/22 1400          PT Short Term Goals    STG Date to Achieve 07/20/22  -     STG 1 Pt’ LEFS score will improve by at least 18 points.  -     STG 1 Progress Not Met  -     STG 2 Pt’s knee ext AROM will improve to 0 deg.  -     STG 2 Progress Not Met  -     STG 3 Pt’s knee flex AROM will improve to at least 90 deg.  -     STG 3 Progress Not Met  -     STG 4 Pt will be able to ambulate at least 600 feet with an assistive device and without gait deviations for safe community ambulation.  -     STG 4 Progress Not Met  -            Long Term Goals    LTG Date to Achieve 08/10/22  -     LTG 1 Pt will be independent with final HEP for self-management of symptoms.  -     LTG 1 Progress Not Met  -     LTG 2 Pt will report a subjective improvement of at least 75% in symptoms as a measure to return to PLOF.  -     LTG 2 Progress Not Met  -     LTG 3 Pt will be independent with all ADLs and IADLs as a measure of return to PLOF.  -     LTG 3 Progress Not Met  -     LTG 4 Pt’s knee flex AROM will improve to at least 120 deg.  -     LTG 4 Progress Not Met  -     LTG 5 Pt’s knee flex/ext MMT will improve to 5/5 as a measure of improved strength.  -     LTG 5 Progress Not Met  -     LTG 6 Pt will be able to ambulate at least 600 feet without an assistive device and without gait deviations for safe community ambulation.  -     LTG 6 Progress Not Met  -            Time Calculation    PT Goal Re-Cert Due " Date 07/15/22  -           User Key  (r) = Recorded By, (t) = Taken By, (c) = Cosigned By    Initials Name Provider Type    Jessica Pham Physical Therapist                               Time Calculation:   Start Time: 1422  Stop Time: 1530  Time Calculation (min): 68 min  Therapy Charges for Today     Code Description Service Date Service Provider Modifiers Qty    51579478308 HC PT THER PROC EA 15 MIN 7/15/2022 Jessica Parikh GP 2    91039539208 HC PT THERAPEUTIC ACT EA 15 MIN 7/15/2022 Jessica Parikh GP 1    [id number]  PT MANUAL THERAPY EA 15 MIN 7/15/2022 Jessica Parikh GP 1    [id number]  PT THER SUPP EA 15 MIN 7/15/2022 Jessica Parikh 1                    Jessica Parikh  7/15/2022

## 2022-07-19 ENCOUNTER — HOSPITAL ENCOUNTER (OUTPATIENT)
Dept: PHYSICAL THERAPY | Facility: HOSPITAL | Age: 62
Setting detail: THERAPIES SERIES
Discharge: HOME | End: 2022-07-19

## 2022-07-19 DIAGNOSIS — Z96.651 STATUS POST TOTAL KNEE REPLACEMENT, RIGHT: Primary | ICD-10-CM

## 2022-07-19 PROCEDURE — 97110 THERAPEUTIC EXERCISES: CPT

## 2022-07-19 PROCEDURE — G0283 ELEC STIM OTHER THAN WOUND: HCPCS

## 2022-07-19 NOTE — THERAPY PROGRESS REPORT/RE-CERT
Outpatient Physical Therapy Ortho Progress Note  [facility]     Patient Name: Mary Alice Garcia  : 1960  MRN: [id number]  Today's Date: 2022      Visit Date: 2022  Attendance:  (45 approved)  Subjective Improvement: 50%  Next MD Visit: 22  Recert Date: 22     Therapy Diagnosis: S/P R TKA on 22    Visit Dx:    ICD-10-CM ICD-9-CM   1. Status post total knee replacement, right  Z96.651 V43.65       Patient Active Problem List   Diagnosis   • Agoraphobia   • Arthropathy, multiple sites   • Bipolar disorder (HCC)   • Depressive disorder, not elsewhere classified   • Esophageal reflux   • Generalized osteoarthritis of multiple sites   • Mood disorder (HCC)   • Obesity   • Sialoadenitis   • Thoracic or lumbosacral neuritis or radiculitis, unspecified   • Venous insufficiency   • Vitamin B 12 deficiency        History reviewed. No pertinent past medical history.     Past Surgical History:   Procedure Laterality Date   •  SECTION     • COLONOSCOPY N/A 10/22/2018    Procedure: COLONOSCOPY;  Surgeon: Christiano Durbin DO;  Location: [facility] ENDOSCOPY;  Service: Gastroenterology   • JOINT REPLACEMENT Left     knee   • LAPAROSCOPIC GASTRIC BANDING     • REPLACEMENT TOTAL KNEE     • TUBAL ABDOMINAL LIGATION          PT Ortho     Row Name 22 1300       Subjective Comments    Subjective Comments Pt state that she cont to have difficulty with sleeping due to knee pain and discomfort. She feels like she is about half way to where she wants to be.  -       Precautions and Contraindications    Contraindications R TKA on 22. L TKA 6 years ago.  -       Subjective Pain    Able to rate subjective pain? yes  -    Pre-Treatment Pain Level 6  -    Post-Treatment Pain Level 7  -       Posture/Observations    Posture/Observations Comments Pt arrived ambulating with FWRW. Incision is clean and covered 50% with clear dressing. Scabs along entire incision.  -        Right Lower Ext    Rt Knee Extension/Flexion AROM 0-3-84 deg  -       MMT (Manual Muscle Testing)    Rt Lower Ext Rt Hip Flexion;Rt Hip Extension;Rt Hip ABduction;Rt Hip ADduction;Rt Hip Internal (Medial) Rotation;Rt Hip External (Lateral) Rotation;Rt Knee Extension;Rt Knee Flexion;Rt Ankle Dorsiflexion  -       MMT Right Lower Ext    Rt Hip Flexion MMT, Gross Movement (4/5) good  -    Rt Hip Extension MMT, Gross Movement (4+/5) good plus  -    Rt Hip ABduction MMT, Gross Movement (4+/5) good plus  -    Rt Hip ADduction MMT, Gross Movement (4/5) good  -    Rt Hip Internal (Medial) Rotation MMT, Gross Movement (4+/5) good plus  -    Rt Hip External (Lateral) Rotation MMT, Gross Movement (4+/5) good plus  -    Rt Knee Extension MMT, Gross Movement (4/5) good  -    Rt Knee Flexion MMT, Gross Movement (4/5) good  -    Rt Ankle Dorsiflexion MMT, Gross Movement (5/5) normal  -       Flexibility    Flexibility Tested? Lower Extremity  -       Lower Extremity Flexibility    Hamstrings Right:;WNL  Limited by joint  -    Quadriceps Right:;Moderately limited  -    Gastrocnemius Right:;Mildly limited  -       Balance Skills Training    SLS LLE:  -    Sharpened [place] LLE leading:  -       Gait/Stairs (Locomotion)    [place] Level (Gait) modified independence  -    Assistive Device (Gait) walker, front-wheeled  -    Comment, (Gait/Stairs) Antaglic gait with decreased knee flex and lacking TKE.  -          User Key  (r) = Recorded By, (t) = Taken By, (c) = Cosigned By    Initials Name Provider Type     Anum Wesley, PT Physical Therapist                             PT Assessment/Plan     Row Name 07/19/22 1300          PT Assessment    Functional Limitations Impaired gait;Impaired locomotion;Limitation in home management;Performance in self-care ADL;Performance in leisure activities  -     Impairments Balance;Edema;Gait;Impaired flexibility;Impaired muscle endurance;Impaired  muscle length;Impaired muscle power;Joint integrity;Joint mobility;Muscle strength;Pain;Poor body mechanics;Range of motion;Sensation  -     Assessment Comments Recheck completed this visit. Pt has met one goal at this time. She has partially met one long term goal. She is making progress towards all goals. She has demonstrated improvements in ROM, LE strength, gait mechanics, and balance. Pt is not as far progressed as typically at this point, but is cont. to demonstrate progress. Pt would benefit from cont skilled PT to address her remaining deficits. Time was spent updating pt's HEP with a focus on knee ROM. Pt was distributed a written handout of updated HEP.  -     Rehab Potential Good  -     Patient/caregiver participated in establishment of treatment plan and goals Yes  -     Patient would benefit from skilled therapy intervention Yes  -            PT Plan    PT Frequency 2x/week  -     Predicted Duration of Therapy Intervention (PT) 4 more weeks  -     PT Plan Comments Cont with focus on knee ROM (flex and ext). Work gait with walker and cane.  -           User Key  (r) = Recorded By, (t) = Taken By, (c) = Cosigned By    Initials Name Provider Type    Anum Wise, ROBERTO Physical Therapist                 Modalities     Row Name 07/19/22 1300             Ice    Ice Applied Yes  -      Location right knee with IF  -      PT Ice Rx Minutes 15  -      Ice S/P Rx Yes  -              ELECTRICAL STIMULATION    Attended/Unattended Unattended  -      Stimulation Type IFC  -MH      Location/Electrode Placement/Other right knee with ice  -            User Key  (r) = Recorded By, (t) = Taken By, (c) = Cosigned By    Initials Name Provider Type    Anum Wise, ROBERTO Physical Therapist               OP Exercises     Row Name 07/19/22 1300             Subjective Comments    Subjective Comments Pt state that she cont to have difficulty with sleeping due to knee pain and discomfort. She  feels like she is about half way to where she wants to be.  -              Subjective Pain    Able to rate subjective pain? yes  -      Pre-Treatment Pain Level 6  -      Post-Treatment Pain Level 7  -              Total Minutes    50174 - PT Therapeutic Exercise Minutes 45  -              Exercise 1    Exercise Name 1 Pro II LE; ROM  -      Time 1 10 min  -      Additional Comments lvl 1; seat 12. Rocking  -              Exercise 2    Exercise Name 2 Recheck  -      Additional Comments ROM, MMT, flexibility, LEFS  -              Exercise 3    Exercise Name 3 Prone quad S with strap  -      Reps 3 3  -MH      Time 3 30 sec hold  -              Exercise 4    Exercise Name 4 Prone TKE  -      Sets 4 2  -MH      Reps 4 10  -      Time 4 5 sec hold  -              Exercise 5    Exercise Name 5 incision inspection  -      Additional Comments Small steri-strip applied just prox to present dressing  -              Exercise 6    Exercise Name 6 IFC with ice  -              Exercise 7    Exercise Name 7 HEP review  -            User Key  (r) = Recorded By, (t) = Taken By, (c) = Cosigned By    Initials Name Provider Type     Anum Wesley, PT Physical Therapist                              PT OP Goals     Row Name 07/19/22 1300          PT Short Term Goals    STG Date to Achieve 08/09/22  -     STG 1 Pt’ LEFS score will improve by at least 18 points.  -     STG 1 Progress Met   -     STG 2 Pt’s knee ext AROM will improve to 0 deg.  -     STG 2 Progress Not Met  [facility]     STG 3 Pt’s knee flex AROM will improve to at least 90 deg.  -     STG 3 Progress Not Met  -     STG 4 Pt will be able to ambulate at least 600 feet with an assistive device and without significant gait deviations for safe community ambulation.  -     STG 4 Progress Not Met  -            Long Term Goals    LTG Date to Achieve 08/23/22  -     LTG 1 Pt will be independent with final HEP for self-management  of symptoms.  -     LTG 1 Progress Not Met  -     LTG 2 Pt will report a subjective improvement of at least 75% in symptoms as a measure to return to PLOF.  -     LTG 2 Progress Not Met  -     LTG 3 Pt will be independent with all ADLs and IADLs as a measure of return to PLOF.  -     LTG 3 Progress Partially Met  -     LTG 3 Progress Comments Met for ADLs, not met for IADLs  -     LTG 4 Pt’s knee flex AROM will improve to at least 120 deg.  -     LTG 4 Progress Not Met  -     LTG 5 Pt’s knee flex/ext MMT will improve to 5/5 as a measure of improved strength.  -     LTG 5 Progress Not Met  -     LTG 6 Pt will be able to ambulate at least 600 feet without an assistive device and without gait deviations for safe community ambulation.  -     LTG 6 Progress Not Met  -            Time Calculation    PT Goal Re-Cert Due Date 08/09/22  -           User Key  (r) = Recorded By, (t) = Taken By, (c) = Cosigned By    Initials Name Provider Type    Anum Wise, PT Physical Therapist                Therapy Education  Education Details: HEP: prone quad S with strap, long sitting heel slide, seated heel slides, prone TKE, knee flex S  Given: HEP  Program: New  How Provided: Verbal, Demonstration, Written  Level of Understanding: Verbalized, Demonstrated    Outcome Measure Options: Lower Extremity Functional Scale (LEFS)  Lower Extremity Functional Index  Any of your usual work, housework or school activities: A little bit of difficulty  Your usual hobbies, recreational or sporting activities: Moderate difficulty  Getting into or out of the bath: No difficulty  Walking between rooms: A little bit of difficulty  Putting on your shoes or socks: No difficulty  Squatting: Moderate difficulty  Lifting an object, like a bag of groceries from the floor: No difficulty  Performing light activities around your home: No difficulty  Performing heavy activities around your home: A little bit of  difficulty  Getting into or out of a car: No difficulty  Walking 2 blocks: Moderate difficulty  Walking a mile: Quite a bit of difficulty  Going up or down 10 stairs (about 1 flight of stairs): Quite a bit of difficulty  Standing for 1 hour: Moderate difficulty  Sitting for 1 hour: No difficulty  Running on even ground: Extreme difficulty or unable to perform activity  Running on uneven ground: Extreme difficulty or unable to perform activity  Making sharp turns while running fast: Extreme difficulty or unable to perform activity  Hopping: Extreme difficulty or unable to perform activity  Rolling over in bed: Moderate difficulty  Total: 45      Time Calculation:   Start Time: 1346  Stop Time: 1445  Time Calculation (min): 59 min  Timed Charges  18656 - PT Therapeutic Exercise Minutes: 45  Untimed Charges  PT E-Stim Unattended Minutes: 15  PT Ice Rx Minutes: 15  Total Minutes  Timed Charges Total Minutes: 45  Untimed Charges Total Minutes: 30   Total Minutes: 75  Therapy Charges for Today     Code Description Service Date Service Provider Modifiers Qty    84673597593 HC PT THER PROC EA 15 MIN 7/19/2022 Anum Wesley, PT GP 3    48424462330 HC PT ELECTRICAL STIM UNATTENDED 7/19/2022 Anum Wesley, PT  1          PT G-Codes  Outcome Measure Options: Lower Extremity Functional Scale (LEFS)  Total: 45         Anum Wesley PT, DPT  7/19/2022

## 2022-07-22 ENCOUNTER — HOSPITAL ENCOUNTER (OUTPATIENT)
Dept: PHYSICAL THERAPY | Facility: HOSPITAL | Age: 62
Setting detail: THERAPIES SERIES
Discharge: HOME | End: 2022-07-22

## 2022-07-22 DIAGNOSIS — Z96.651 STATUS POST TOTAL KNEE REPLACEMENT, RIGHT: Primary | ICD-10-CM

## 2022-07-22 PROCEDURE — 97110 THERAPEUTIC EXERCISES: CPT | Performed by: PHYSICAL THERAPIST

## 2022-07-22 PROCEDURE — 97140 MANUAL THERAPY 1/> REGIONS: CPT | Performed by: PHYSICAL THERAPIST

## 2022-07-22 PROCEDURE — 97530 THERAPEUTIC ACTIVITIES: CPT | Performed by: PHYSICAL THERAPIST

## 2022-07-22 NOTE — THERAPY TREATMENT NOTE
Outpatient Physical Therapy Ortho Treatment Note  [facility]     Patient Name: Mary Alice Garcia  : 1960  MRN: [id number]  Today's Date: 2022      Visit Date: 2022  Attendance:  (45 approved)  Subjective Improvement: 50%  Next MD Visit: 22  Recert Date: 22     Therapy Diagnosis: S/P R TKA on 22    ICD-10-CM ICD-9-CM   1. Status post total knee replacement, right  Z96.651 V43.65       Patient Active Problem List   Diagnosis   • Agoraphobia   • Arthropathy, multiple sites   • Bipolar disorder (HCC)   • Depressive disorder, not elsewhere classified   • Esophageal reflux   • Generalized osteoarthritis of multiple sites   • Mood disorder (HCC)   • Obesity   • Sialoadenitis   • Thoracic or lumbosacral neuritis or radiculitis, unspecified   • Venous insufficiency   • Vitamin B 12 deficiency        No past medical history on file.     Past Surgical History:   Procedure Laterality Date   •  SECTION     • COLONOSCOPY N/A 10/22/2018    Procedure: COLONOSCOPY;  Surgeon: Christiano Durbin DO;  Location: [facility] ENDOSCOPY;  Service: Gastroenterology   • JOINT REPLACEMENT Left     knee   • LAPAROSCOPIC GASTRIC BANDING     • REPLACEMENT TOTAL KNEE     • TUBAL ABDOMINAL LIGATION          PT Ortho     Row Name 22 1400       Subjective Pain    Able to rate subjective pain? yes  -SW    Pre-Treatment Pain Level 7  -SW       Right Lower Ext    Rt Knee Extension/Flexion PROM -3-90  -SW          User Key  (r) = Recorded By, (t) = Taken By, (c) = Cosigned By    Initials Name Provider Type    Jessica Pham Physical Therapist                             PT Assessment/Plan     Row Name 22 1400          PT Assessment    Assessment Comments Patient continues to make gradual progress. She still lacks a few degress of extension. She was able to obtain 90 degrees of passive right knee flexion today. She is ambulating with better flexion at toe off with RW and strength is  "improving as demonstrated by improved stair stepping.  -SW            PT Plan    PT Frequency 2x/week  -SW     PT Plan Comments Focus on right knee felxion and extension ROM and quad strength.  -SW           User Key  (r) = Recorded By, (t) = Taken By, (c) = Cosigned By    Initials Name Provider Type    Jessica Pham Physical Therapist                   OP Exercises     Row Name 07/22/22 1400             Subjective Comments    Subjective Comments Patient reports she didn't sleep well last night an dright knee seems to be hurting more today.  -SW              Subjective Pain    Able to rate subjective pain? yes  -SW      Pre-Treatment Pain Level 7  -SW              Exercise 1    Exercise Name 1 Prol Level II L1  -SW      Time 1 10'  -SW              Exercise 2    Exercise Name 2 ramp  -SW      Reps 2 3  -SW      Time 2 --  -SW              Exercise 3    Exercise Name 3 step ups 6\"  -SW      Sets 3 2  -SW      Reps 3 10  -SW              Exercise 4    Exercise Name 4 side step up 6\"  -SW      Reps 4 10 ea way  -SW              Exercise 5    Exercise Name 5 marching in standing on airex  -SW      Reps 5 20  -SW              Exercise 6    Exercise Name 6 laq 3#  -SW      Reps 6 20x5\"  -SW      Time 6 --  -SW              Exercise 7    Exercise Name 7 long sitting hamstring stretch with roll under heel and overpressure above knee  -SW      Reps 7 30\"x3  -SW              Exercise 8    Exercise Name 8 heel slides with strap in sitting  -SW      Reps 8 10\"x10  -SW              Exercise 9    Exercise Name 9 SLR flexion  -SW      Reps 9 20  -SW              Exercise 10    Exercise Name 10 STM  -SW      Time 10 10'  -SW              Exercise 11    Exercise Name 11 --  -SW      Time 11 --  -SW            User Key  (r) = Recorded By, (t) = Taken By, (c) = Cosigned By    Initials Name Provider Type    Jessica Pham Physical Therapist                              PT OP Goals     Row Name 07/22/22 1400          PT Short Term " Goals    STG Date to Achieve 08/09/22  -     STG 1 Pt’ LEFS score will improve by at least 18 points.  -     STG 1 Progress Met  -     STG 2 Pt’s knee ext AROM will improve to 0 deg.  -     STG 2 Progress Not Met;Progressing  -     STG 3 Pt’s knee flex AROM will improve to at least 90 deg.  -     STG 3 Progress Met  -     STG 4 Pt will be able to ambulate at least 600 feet with an assistive device and without significant gait deviations for safe community ambulation.  -     STG 4 Progress Not Met;Progressing  -            Long Term Goals    LTG Date to Achieve 08/23/22  -     LTG 1 Pt will be independent with final HEP for self-management of symptoms.  -     LTG 1 Progress Not Met  -     LTG 2 Pt will report a subjective improvement of at least 75% in symptoms as a measure to return to PLOF.  -     LTG 2 Progress Not Met  -     LTG 3 Pt will be independent with all ADLs and IADLs as a measure of return to PLOF.  -     LTG 3 Progress Partially Met  -     LTG 4 Pt’s knee flex AROM will improve to at least 120 deg.  -     LTG 4 Progress Not Met  -     LTG 5 Pt’s knee flex/ext MMT will improve to 5/5 as a measure of improved strength.  -     LT 5 Progress Not Met  -     LTG 6 Pt will be able to ambulate at least 600 feet without an assistive device and without gait deviations for safe community ambulation.  -     LT 6 Progress Not Met  -            Time Calculation    PT Goal Re-Cert Due Date 08/09/22  [facility]           User Key  (r) = Recorded By, (t) = Taken By, (c) = Cosigned By    Initials Name Provider Type    Jessica Pham Physical Therapist                               Time Calculation:   Start Time: 1431  Stop Time: 1520  Time Calculation (min): 49 min  Therapy Charges for Today     Code Description Service Date Service Provider Modifiers Qty    70313267458 HC PT THERAPEUTIC ACT EA 15 MIN 7/22/2022 Jessica Parikh GP 1    07641979049 HC PT THERAPEUTIC ACT EA 15 MIN  7/22/2022 Jessica Parikh GP 1    05794516247 HC PT MANUAL THERAPY EA 15 MIN 7/22/2022 Jessica Parikh GP 1                    Jessica Parikh  7/22/2022

## 2022-07-26 ENCOUNTER — HOSPITAL ENCOUNTER (OUTPATIENT)
Dept: PHYSICAL THERAPY | Facility: HOSPITAL | Age: 62
Setting detail: THERAPIES SERIES
Discharge: HOME | End: 2022-07-26

## 2022-07-26 DIAGNOSIS — Z96.651 STATUS POST TOTAL KNEE REPLACEMENT, RIGHT: Primary | ICD-10-CM

## 2022-07-26 PROCEDURE — 97530 THERAPEUTIC ACTIVITIES: CPT

## 2022-07-26 PROCEDURE — G0283 ELEC STIM OTHER THAN WOUND: HCPCS

## 2022-07-26 PROCEDURE — 97110 THERAPEUTIC EXERCISES: CPT

## 2022-07-26 PROCEDURE — 97140 MANUAL THERAPY 1/> REGIONS: CPT

## 2022-07-26 NOTE — THERAPY TREATMENT NOTE
Outpatient Physical Therapy Ortho Treatment Note  [facility]     Patient Name: Mary Alice Garcia  : 1960  MRN: [id number]  Today's Date: 2022      Visit Date: 2022     Attendance: 10/10 (45 approved)  Subjective Improvement: 50%  Next MD Visit: 22  Recert Date: 22     Therapy Diagnosis: S/P R TKA on 22    Visit Dx:    ICD-10-CM ICD-9-CM   1. Status post total knee replacement, right  Z96.651 V43.65       Patient Active Problem List   Diagnosis   • Agoraphobia   • Arthropathy, multiple sites   • Bipolar disorder (HCC)   • Depressive disorder, not elsewhere classified   • Esophageal reflux   • Generalized osteoarthritis of multiple sites   • Mood disorder (HCC)   • Obesity   • Sialoadenitis   • Thoracic or lumbosacral neuritis or radiculitis, unspecified   • Venous insufficiency   • Vitamin B 12 deficiency        No past medical history on file.     Past Surgical History:   Procedure Laterality Date   •  SECTION     • COLONOSCOPY N/A 10/22/2018    Procedure: COLONOSCOPY;  Surgeon: Christiano Durbin DO;  Location: [facility] ENDOSCOPY;  Service: Gastroenterology   • JOINT REPLACEMENT Left     knee   • LAPAROSCOPIC GASTRIC BANDING     • REPLACEMENT TOTAL KNEE     • TUBAL ABDOMINAL LIGATION          PT Ortho     Row Name 22 1300       Precautions and Contraindications    Contraindications R TKA on 22. L TKA 6 years ago.  -KH       Subjective Pain    Post-Treatment Pain Level 2  -KH       Posture/Observations    Posture/Observations Comments patient continues with RW,  decrease knee flexion with gait; incision uncovered scabbed  -KH       Right Lower Ext    Rt Knee Extension/Flexion AROM 0-100 seated in chair;  -KH          User Key  (r) = Recorded By, (t) = Taken By, (c) = Cosigned By    Initials Name Provider Type    Tonya Hatfield PTA Physical Therapist Assistant                             PT Assessment/Plan     Row Name 22 1300          PT Assessment     Functional Limitations Impaired gait;Impaired locomotion;Limitation in home management;Performance in self-care ADL;Performance in leisure activities  -     Impairments Balance;Edema;Gait;Impaired flexibility;Impaired muscle endurance;Impaired muscle length;Impaired muscle power;Joint integrity;Joint mobility;Muscle strength;Pain;Poor body mechanics;Range of motion;Sensation  -     Assessment Comments improved flexion/extension in the R knee today; progressing well with strength and gait as well;  -KH     Rehab Potential Good  -     Patient/caregiver participated in establishment of treatment plan and goals Yes  -KH     Patient would benefit from skilled therapy intervention Yes  -KH            PT Plan    PT Frequency 2x/week  -     Predicted Duration of Therapy Intervention (PT) 4 more weeks  -     PT Plan Comments Progress gait with SPC next visit; Continue to progress ROM as able.  -           User Key  (r) = Recorded By, (t) = Taken By, (c) = Cosigned By    Initials Name Provider Type    Tonya Hatfield PTA Physical Therapist Assistant                 Modalities     Row Name 07/26/22 1300             Ice    Ice Applied Yes  -      Location right knee with IF  -      PT Ice Rx Minutes 15  -KH      Ice S/P Rx Yes  -KH              ELECTRICAL STIMULATION    Attended/Unattended Unattended  -      Stimulation Type IFC  -KH      Location/Electrode Placement/Other right knee with ice  -      PT E-Stim Unattended Minutes 15  -KH            User Key  (r) = Recorded By, (t) = Taken By, (c) = Cosigned By    Initials Name Provider Type    Tonya Hatfield PTA Physical Therapist Assistant               OP Exercises     Row Name 07/26/22 1300             Subjective Comments    Subjective Comments pain improving; reports that she is doing well; Drove to PT today;  -              Subjective Pain    Able to rate subjective pain? yes  -      Pre-Treatment Pain Level 4  -KH      Post-Treatment Pain Level  "2  -KH              Exercise 1    Exercise Name 1 pro ll LE strength/ROM  -      Time 1 10 mins  -KH      Additional Comments level 2; seat 12 full circles  -              Exercise 2    Exercise Name 2 standing lunge flexion stretch  -KH      Sets 2 3  -KH      Time 2 30\" holds  -KH              Exercise 3    Exercise Name 3 step ups fwd & lat  -KH      Sets 3 2  -KH      Reps 3 10 each  -KH      Additional Comments 6\" step  -KH              Exercise 4    Exercise Name 4 standing TKEs w/ ball at wall  -KH      Sets 4 2  -KH      Reps 4 10  -KH      Time 4 5\" holds  -KH              Exercise 5    Exercise Name 5 sit to stands w/ UE (seat elevated)  -KH      Sets 5 2  -KH      Reps 5 10  -KH              Exercise 6    Exercise Name 6 Ramp 2 way  -KH      Reps 6 5 trips each  -KH      Additional Comments fwd, bwd-->fwd  -KH            User Key  (r) = Recorded By, (t) = Taken By, (c) = Cosigned By    Initials Name Provider Type    Tonya Hatfield, PTA Physical Therapist Assistant                              PT OP Goals     Row Name 07/26/22 1300          PT Short Term Goals    STG Date to Achieve 08/09/22  -     STG 1 Pt’ LEFS score will improve by at least 18 points.  -     STG 1 Progress Met  -     STG 2 Pt’s knee ext AROM will improve to 0 deg.  -     STG 2 Progress Not Met;Progressing  -     STG 3 Pt’s knee flex AROM will improve to at least 90 deg.  -     STG 3 Progress Met  -     STG 4 Pt will be able to ambulate at least 600 feet with an assistive device and without significant gait deviations for safe community ambulation.  -     STG 4 Progress Not Met;Progressing  -            Long Term Goals    LTG Date to Achieve 08/23/22  -     LTG 1 Pt will be independent with final HEP for self-management of symptoms.  -     LTG 1 Progress Not Met  -     LTG 2 Pt will report a subjective improvement of at least 75% in symptoms as a measure to return to PLOF.  -     LTG 2 Progress Not Met  -KH  "    LTG 3 Pt will be independent with all ADLs and IADLs as a measure of return to PLOF.  -     LTG 3 Progress Partially Met  -     LTG 4 Pt’s knee flex AROM will improve to at least 120 deg.  -     LTG 4 Progress Not Met  -     LTG 5 Pt’s knee flex/ext MMT will improve to 5/5 as a measure of improved strength.  -     LTG 5 Progress Not Met  -     LTG 6 Pt will be able to ambulate at least 600 feet without an assistive device and without gait deviations for safe community ambulation.  -     LTG 6 Progress Not Met  -            Time Calculation    PT Goal Re-Cert Due Date 08/09/22  -           User Key  (r) = Recorded By, (t) = Taken By, (c) = Cosigned By    Initials Name Provider Type    Tonya Hatfield PTA Physical Therapist Assistant                               Time Calculation:   Start Time: 1307  Stop Time: 1404  Time Calculation (min): 57 min  Untimed Charges  PT E-Stim Unattended Minutes: 15  PT Ice Rx Minutes: 15  Total Minutes  Untimed Charges Total Minutes: 30   Total Minutes: 30  Therapy Charges for Today     Code Description Service Date Service Provider Modifiers Qty    52970969024 HC PT THER SUPP EA 15 MIN 7/26/2022 Tonya Castañeda PTA GP 1    45809058043 HC PT ELECTRICAL STIM UNATTENDED 7/26/2022 Tonya Castañeda PTA CQ 1    92256432635 HC PT MANUAL THERAPY EA 15 MIN 7/26/2022 Tonya Castañeda PTA GP, CQ 1    31685970646 HC PT THER PROC EA 15 MIN 7/26/2022 Tonya Castañeda PTA GP, CQ 2    64018891388 HC PT THERAPEUTIC ACT EA 15 MIN 7/26/2022 Tonya Castañeda PTA GP, CQ 1                    Tonya Castañeda PTA  7/26/2022

## 2022-07-29 ENCOUNTER — HOSPITAL ENCOUNTER (OUTPATIENT)
Dept: PHYSICAL THERAPY | Facility: HOSPITAL | Age: 62
Setting detail: THERAPIES SERIES
Discharge: HOME | End: 2022-07-29

## 2022-07-29 DIAGNOSIS — Z96.651 STATUS POST TOTAL KNEE REPLACEMENT, RIGHT: Primary | ICD-10-CM

## 2022-07-29 PROCEDURE — 97110 THERAPEUTIC EXERCISES: CPT

## 2022-07-29 PROCEDURE — G0283 ELEC STIM OTHER THAN WOUND: HCPCS

## 2022-07-29 NOTE — THERAPY TREATMENT NOTE
Outpatient Physical Therapy Ortho Treatment Note  [facility]     Patient Name: Mary Alice Garcia  : 1960  MRN: [id number]  Today's Date: 2022      Visit Date: 2022     Attendance: (45 approved)  Subjective Improvement: 50%  Next MD Visit: 22  Recert Date: 22     Therapy Diagnosis: S/P R TKA on 22       Visit Dx:    ICD-10-CM ICD-9-CM   1. Status post total knee replacement, right  Z96.651 V43.65       Patient Active Problem List   Diagnosis   • Agoraphobia   • Arthropathy, multiple sites   • Bipolar disorder (HCC)   • Depressive disorder, not elsewhere classified   • Esophageal reflux   • Generalized osteoarthritis of multiple sites   • Mood disorder (HCC)   • Obesity   • Sialoadenitis   • Thoracic or lumbosacral neuritis or radiculitis, unspecified   • Venous insufficiency   • Vitamin B 12 deficiency        No past medical history on file.     Past Surgical History:   Procedure Laterality Date   •  SECTION     • COLONOSCOPY N/A 10/22/2018    Procedure: COLONOSCOPY;  Surgeon: Christiano Durbin DO;  Location: [facility] ENDOSCOPY;  Service: Gastroenterology   • JOINT REPLACEMENT Left     knee   • LAPAROSCOPIC GASTRIC BANDING     • REPLACEMENT TOTAL KNEE     • TUBAL ABDOMINAL LIGATION          PT Ortho     Row Name 22 1300       Subjective Comments    Subjective Comments continues to have improved pain and feels overall is she is doing better; Reports that she is 75% better overall  -       Precautions and Contraindications    Contraindications R TKA on 22. L TKA 6 years ago.  -KH       Subjective Pain    Post-Treatment Pain Level 2  -       Posture/Observations    Posture/Observations Comments used RW into clinic; SPC used throughout treatment;  -KH       Right Lower Ext    Rt Knee Extension/Flexion AROM 0-110° AROM in long sitting  -          User Key  (r) = Recorded By, (t) = Taken By, (c) = Cosigned By    Initials Name Provider Type    MACI  Tonya Castañeda PTA Physical Therapist Assistant                             PT Assessment/Plan     Row Name 07/29/22 1300          PT Assessment    Functional Limitations Impaired gait;Impaired locomotion;Limitation in home management;Performance in self-care ADL;Performance in leisure activities  -     Impairments Balance;Edema;Gait;Impaired flexibility;Impaired muscle endurance;Impaired muscle length;Impaired muscle power;Joint integrity;Joint mobility;Muscle strength;Pain;Poor body mechanics;Range of motion;Sensation  -     Assessment Comments d/c'd walker this date; independent with SPC; progressed with strength and ROM this date without pain  -     Rehab Potential Good  -     Patient/caregiver participated in establishment of treatment plan and goals Yes  -     Patient would benefit from skilled therapy intervention Yes  -            PT Plan    PT Frequency 2x/week  -     Predicted Duration of Therapy Intervention (PT) 4 more weeks  -     PT Plan Comments Continue to progress strength and stabilization of the right knee as able.  -           User Key  (r) = Recorded By, (t) = Taken By, (c) = Cosigned By    Initials Name Provider Type     Tonya Castañeda PTA Physical Therapist Assistant                 Modalities     Row Name 07/29/22 1300             Ice    Ice Applied Yes  -      Location right knee with IF  -      PT Ice Rx Minutes 15  -KH      Ice S/P Rx Yes  -              ELECTRICAL STIMULATION    Attended/Unattended Unattended  -      Stimulation Type IFC  -      Location/Electrode Placement/Other right knee with ice  -      PT E-Stim Unattended Minutes 15  -KH            User Key  (r) = Recorded By, (t) = Taken By, (c) = Cosigned By    Initials Name Provider Type    Tonya Hatfield PTA Physical Therapist Assistant               OP Exercises     Row Name 07/29/22 1300             Subjective Comments    Subjective Comments continues to have improved pain and feels overall is she  "is doing better; Reports that she is 75% better overall  -              Subjective Pain    Able to rate subjective pain? yes  -      Pre-Treatment Pain Level 3  -KH      Post-Treatment Pain Level 2  -              Exercise 1    Exercise Name 1 pro ll LE strength/ROM  -KH      Time 1 10 mins  -KH      Additional Comments level 2; seat 8  -KH              Exercise 2    Exercise Name 2 incline stretch  -      Cueing 2 Verbal;Demo  -KH      Sets 2 3  -KH      Reps 2 30\"  -KH              Exercise 3    Exercise Name 3 gait with SPC around clinic  -      Reps 3 270ft x1  -KH              Exercise 4    Exercise Name 4 step ups fwd & lateral  -KH      Sets 4 2  -KH      Reps 4 10 each  -KH      Additional Comments 6\" step  -KH              Exercise 5    Exercise Name 5 SLS w/ finger hold  -KH      Sets 5 5  -KH      Time 5 15\" holds  -KH              Exercise 6    Exercise Name 6 cybex hip abd & add  -KH      Sets 6 2  -KH      Reps 6 10  -KH      Time 6 each  -KH      Additional Comments 30#  -KH              Exercise 7    Exercise Name 7 cybex leg press  -KH      Sets 7 2  -KH      Reps 7 10  -KH      Additional Comments 90#  -KH            User Key  (r) = Recorded By, (t) = Taken By, (c) = Cosigned By    Initials Name Provider Type    Tonya Hatfield PTA Physical Therapist Assistant                              PT OP Goals     Row Name 07/29/22 1300          PT Short Term Goals    STG Date to Achieve 08/09/22  -     STG 1 Pt’ LEFS score will improve by at least 18 points.  -     STG 1 Progress Met  -     STG 2 Pt’s knee ext AROM will improve to 0 deg.  -     STG 2 Progress Not Met;Progressing  -     STG 3 Pt’s knee flex AROM will improve to at least 90 deg.  -     STG 3 Progress Met  -     STG 4 Pt will be able to ambulate at least 600 feet with an assistive device and without significant gait deviations for safe community ambulation.  -     STG 4 Progress Not Met;Progressing  -            " Long Term Goals    LTG Date to Achieve 08/23/22  -     LTG 1 Pt will be independent with final HEP for self-management of symptoms.  -     LTG 1 Progress Not Met  -     LTG 2 Pt will report a subjective improvement of at least 75% in symptoms as a measure to return to PLOF.  -     LTG 2 Progress Not Met  -     LTG 3 Pt will be independent with all ADLs and IADLs as a measure of return to PLOF.  -     LTG 3 Progress Partially Met  -     LTG 4 Pt’s knee flex AROM will improve to at least 120 deg.  -     LTG 4 Progress Not Met  -     LTG 5 Pt’s knee flex/ext MMT will improve to 5/5 as a measure of improved strength.  -     LTG 5 Progress Not Met  -     LTG 6 Pt will be able to ambulate at least 600 feet without an assistive device and without gait deviations for safe community ambulation.  -     LTG 6 Progress Not Met  -            Time Calculation    PT Goal Re-Cert Due Date 08/09/22  -           User Key  (r) = Recorded By, (t) = Taken By, (c) = Cosigned By    Initials Name Provider Type    Tonya Hatfield PTA Physical Therapist Assistant                               Time Calculation:   Start Time: 1300  Stop Time: 1400  Time Calculation (min): 60 min  Untimed Charges  PT E-Stim Unattended Minutes: 15  PT Ice Rx Minutes: 15  Total Minutes  Untimed Charges Total Minutes: 30   Total Minutes: 30  Therapy Charges for Today     Code Description Service Date Service Provider Modifiers Qty    65362732600 HC PT THER SUPP EA 15 MIN 7/29/2022 Tonya Castañeda PTA GP 1    22170143710 HC PT ELECTRICAL STIM UNATTENDED 7/29/2022 Tonya Castañeda PTA CQ 1    96993458912 HC PT THER PROC EA 15 MIN 7/29/2022 Tonya Castañeda PTA GP, CQ 3                    Tonya Castañeda PTA  7/29/2022

## 2022-08-02 ENCOUNTER — HOSPITAL ENCOUNTER (OUTPATIENT)
Dept: PHYSICAL THERAPY | Facility: HOSPITAL | Age: 62
Setting detail: THERAPIES SERIES
Discharge: HOME | End: 2022-08-02

## 2022-08-02 DIAGNOSIS — Z96.651 STATUS POST TOTAL KNEE REPLACEMENT, RIGHT: Primary | ICD-10-CM

## 2022-08-02 PROCEDURE — 97110 THERAPEUTIC EXERCISES: CPT

## 2022-08-02 NOTE — THERAPY TREATMENT NOTE
Outpatient Physical Therapy Ortho Treatment Note  [facility]     Patient Name: Mary Alice Garcia  : 1960  MRN: [id number]  Today's Date: 2022      Visit Date: 2022     Attendance:  (45 approved)  Subjective Improvement: 50%  Next MD Visit: 22  Recert Date: 22     Therapy Diagnosis: S/P R TKA on 22    Visit Dx:    ICD-10-CM ICD-9-CM   1. Status post total knee replacement, right  Z96.651 V43.65       Patient Active Problem List   Diagnosis   • Agoraphobia   • Arthropathy, multiple sites   • Bipolar disorder (HCC)   • Depressive disorder, not elsewhere classified   • Esophageal reflux   • Generalized osteoarthritis of multiple sites   • Mood disorder (HCC)   • Obesity   • Sialoadenitis   • Thoracic or lumbosacral neuritis or radiculitis, unspecified   • Venous insufficiency   • Vitamin B 12 deficiency        No past medical history on file.     Past Surgical History:   Procedure Laterality Date   •  SECTION     • COLONOSCOPY N/A 10/22/2018    Procedure: COLONOSCOPY;  Surgeon: Christiano Durbin DO;  Location: [facility] ENDOSCOPY;  Service: Gastroenterology   • JOINT REPLACEMENT Left     knee   • LAPAROSCOPIC GASTRIC BANDING     • REPLACEMENT TOTAL KNEE     • TUBAL ABDOMINAL LIGATION          PT Ortho     Row Name 22 1300       Precautions and Contraindications    Contraindications R TKA on 22. L TKA 6 years ago.  -KH       Subjective Pain    Post-Treatment Pain Level 2  -KH    Subjective Pain Comment Ice to go  -KH       Posture/Observations    Posture/Observations Comments using SPC into clinic and throughout treatment good stride  -KH       Right Lower Ext    Rt Knee Extension/Flexion AROM 0-112°  -KH          User Key  (r) = Recorded By, (t) = Taken By, (c) = Cosigned By    Initials Name Provider Type    Tonya Hatfield PTA Physical Therapist Assistant                             PT Assessment/Plan     Row Name 22 1300          PT Assessment  "   Functional Limitations Impaired gait;Impaired locomotion;Limitation in home management;Performance in self-care ADL;Performance in leisure activities  -     Impairments Balance;Edema;Gait;Impaired flexibility;Impaired muscle endurance;Impaired muscle length;Impaired muscle power;Joint integrity;Joint mobility;Muscle strength;Pain;Poor body mechanics;Range of motion;Sensation  -     Assessment Comments scar mobility improving; tightness in quad today, shown massage for home use; did well with progression of other exercises  -     Rehab Potential Good  -     Patient/caregiver participated in establishment of treatment plan and goals Yes  -     Patient would benefit from skilled therapy intervention Yes  -KH            PT Plan    PT Frequency 2x/week  -     Predicted Duration of Therapy Intervention (PT) 4 more weeks  -     PT Plan Comments Continue to progress strength as able. Sit to stands without UE next  -           User Key  (r) = Recorded By, (t) = Taken By, (c) = Cosigned By    Initials Name Provider Type     Tonya Castañeda PTA Physical Therapist Assistant                   OP Exercises     Row Name 08/02/22 1300             Subjective Comments    Subjective Comments Had a good weekend; Knee pain continues to improve; reports that her incision scab busted open but cleaned it and covered it and it seems to be ok.  -              Subjective Pain    Able to rate subjective pain? yes  -      Pre-Treatment Pain Level 3  -      Post-Treatment Pain Level 2  -      Subjective Pain Comment Ice to go  -              Exercise 1    Exercise Name 1 pro ll LE strength/ROM  -      Time 1 10 mins  -      Additional Comments level 3; seat 8  -              Exercise 2    Exercise Name 2 incline stretch  -      Cueing 2 Verbal;Demo  -      Sets 2 3  -      Reps 2 30\"  -              Exercise 3    Exercise Name 3 standing lunge flexion stretch  -      Sets 3 3  -KH      Time 3 30\" holds " " -              Exercise 4    Exercise Name 4 BOSU step ups Fwd & lat  -      Sets 4 2  -      Reps 4 10 each  -              Exercise 5    Exercise Name 5 SLS w/ finger hold  -      Sets 5 5  -      Time 5 30\" holds  -              Exercise 6    Exercise Name 6 SLR  -      Sets 6 2  -KH      Reps 6 10  -              Exercise 7    Exercise Name 7 scar massage  -      Time 7 5 mins  -            User Key  (r) = Recorded By, (t) = Taken By, (c) = Cosigned By    Initials Name Provider Type    Tonya Hatfield PTA Physical Therapist Assistant                              PT OP Goals     Row Name 08/02/22 1300          PT Short Term Goals    STG Date to Achieve 08/09/22  -     STG 1 Pt’ LEFS score will improve by at least 18 points.  -     STG 1 Progress Met  -     STG 2 Pt’s knee ext AROM will improve to 0 deg.  -     STG 2 Progress Not Met;Progressing  -     STG 3 Pt’s knee flex AROM will improve to at least 90 deg.  -     STG 3 Progress Met  -     STG 4 Pt will be able to ambulate at least 600 feet with an assistive device and without significant gait deviations for safe community ambulation.  -     STG 4 Progress Not Met;Progressing  -            Long Term Goals    LTG Date to Achieve 08/23/22  -     LTG 1 Pt will be independent with final HEP for self-management of symptoms.  -     LTG 1 Progress Not Met  -     LTG 2 Pt will report a subjective improvement of at least 75% in symptoms as a measure to return to PLOF.  -     LTG 2 Progress Not Met  -     LTG 3 Pt will be independent with all ADLs and IADLs as a measure of return to PLOF.  -     LTG 3 Progress Partially Met  -     LTG 4 Pt’s knee flex AROM will improve to at least 120 deg.  -     LTG 4 Progress Not Met  -     LTG 5 Pt’s knee flex/ext MMT will improve to 5/5 as a measure of improved strength.  -     LTG 5 Progress Not Met  -     LTG 6 Pt will be able to ambulate at least 600 feet without an " assistive device and without gait deviations for safe community ambulation.  -MACI     LTG 6 Progress Not Met  -MACI            Time Calculation    PT Goal Re-Cert Due Date 08/09/22  -MACI           User Key  (r) = Recorded By, (t) = Taken By, (c) = Cosigned By    Initials Name Provider Type    Tonya Hatfield PTA Physical Therapist Assistant                               Time Calculation:   Start Time: 1308  Stop Time: 1348  Time Calculation (min): 40 min  Therapy Charges for Today     Code Description Service Date Service Provider Modifiers Qty    36015365571 HC PT THER PROC EA 15 MIN 8/2/2022 Tonya Castañeda PTA GP, CQ 3                    Tonya Castañeda PTA  8/2/2022

## 2022-08-05 ENCOUNTER — HOSPITAL ENCOUNTER (OUTPATIENT)
Dept: PHYSICAL THERAPY | Facility: HOSPITAL | Age: 62
Setting detail: THERAPIES SERIES
Discharge: HOME | End: 2022-08-05

## 2022-08-05 DIAGNOSIS — Z96.651 STATUS POST TOTAL KNEE REPLACEMENT, RIGHT: Primary | ICD-10-CM

## 2022-08-05 PROCEDURE — 97140 MANUAL THERAPY 1/> REGIONS: CPT

## 2022-08-05 PROCEDURE — 97110 THERAPEUTIC EXERCISES: CPT

## 2022-08-05 PROCEDURE — G0283 ELEC STIM OTHER THAN WOUND: HCPCS

## 2022-08-05 NOTE — THERAPY TREATMENT NOTE
Outpatient Physical Therapy Ortho Treatment Note  [facility]     Patient Name: Mary Alice Garcia  : 1960  MRN: [id number]  Today's Date: 2022      Visit Date: 2022     Attendance:  (45 approved)  Subjective Improvement: 50%  Next MD Visit: 22  Recert Date: 22     Therapy Diagnosis: S/P R TKA on 22    Visit Dx:    ICD-10-CM ICD-9-CM   1. Status post total knee replacement, right  Z96.651 V43.65       Patient Active Problem List   Diagnosis   • Agoraphobia   • Arthropathy, multiple sites   • Bipolar disorder (HCC)   • Depressive disorder, not elsewhere classified   • Esophageal reflux   • Generalized osteoarthritis of multiple sites   • Mood disorder (HCC)   • Obesity   • Sialoadenitis   • Thoracic or lumbosacral neuritis or radiculitis, unspecified   • Venous insufficiency   • Vitamin B 12 deficiency        No past medical history on file.     Past Surgical History:   Procedure Laterality Date   •  SECTION     • COLONOSCOPY N/A 10/22/2018    Procedure: COLONOSCOPY;  Surgeon: Christiano Durbin DO;  Location: [facility] ENDOSCOPY;  Service: Gastroenterology   • JOINT REPLACEMENT Left     knee   • LAPAROSCOPIC GASTRIC BANDING     • REPLACEMENT TOTAL KNEE     • TUBAL ABDOMINAL LIGATION          PT Ortho     Row Name 22 1300       Precautions and Contraindications    Contraindications R TKA on 22. L TKA 6 years ago.  -MACI       Posture/Observations    Posture/Observations Comments using SPC into clinic but no use of it during treatment; open scabs at proximal end of scar, with  mild redness throughout and minimal increase of temp in the R knee; Increased swelling this date  -       Right Lower Ext    Rt Knee Extension/Flexion AROM 0-110°  -          User Key  (r) = Recorded By, (t) = Taken By, (c) = Cosigned By    Initials Name Provider Type    Tonya Hatfield PTA Physical Therapist Assistant                             PT Assessment/Plan     Row Name  08/05/22 1300          PT Assessment    Functional Limitations Impaired gait;Impaired locomotion;Limitation in home management;Performance in self-care ADL;Performance in leisure activities  -     Impairments Balance;Edema;Gait;Impaired flexibility;Impaired muscle endurance;Impaired muscle length;Impaired muscle power;Joint integrity;Joint mobility;Muscle strength;Pain;Poor body mechanics;Range of motion;Sensation  -     Assessment Comments MD note sent with patient; did have increased warmth and swelling in the R knee this date but no loss in motion since last visit;  -     Rehab Potential Good  -     Patient/caregiver participated in establishment of treatment plan and goals Yes  -KH     Patient would benefit from skilled therapy intervention Yes  -KH            PT Plan    PT Frequency 2x/week  -     Predicted Duration of Therapy Intervention (PT) 4 more weeks  -     PT Plan Comments Continue gait without cane; progress strength and ROM as able.  -           User Key  (r) = Recorded By, (t) = Taken By, (c) = Cosigned By    Initials Name Provider Type    Tonya Hatfield PTA Physical Therapist Assistant                 Modalities     Row Name 08/05/22 1300             Ice    Ice Applied Yes  -      Location right knee with [facility]  -      PT Ice Rx Minutes 15  -KH      Ice S/P Rx Yes  -KH              ELECTRICAL STIMULATION    Attended/Unattended Unattended  -      Stimulation Type IFC  -KH      Location/Electrode Placement/Other right knee with ice  -      PT E-Stim Unattended Minutes 15  -KH            User Key  (r) = Recorded By, (t) = Taken By, (c) = Cosigned By    Initials Name Provider Type    Tonya Hatfield PTA Physical Therapist Assistant               OP Exercises     Row Name 08/05/22 1300             Subjective Comments    Subjective Comments Reports that she is seeing the MD today;  -              Subjective Pain    Able to rate subjective pain? yes  -      Pre-Treatment Pain Level  "3  -KH      Post-Treatment Pain Level 2  -              Exercise 1    Exercise Name 1 pro ll LE strength/ROM  -      Time 1 10 mins  -KH      Additional Comments level 3.5; seat 8  -KH              Exercise 2    Exercise Name 2 standing lunge flexion  -      Sets 2 1  -KH      Reps 2 10  -KH      Time 2 10\" holds  -              Exercise 3    Exercise Name 3 sit to stands w/ UE assist  -      Sets 3 2  -KH      Reps 3 10  -KH              Exercise 4    Exercise Name 4 seated heel slides AAROM  -      Time 4 5 mins  -KH              Exercise 5    Exercise Name 5 Tib/Fib glides at knee to help improve flexion w/ massage to help with swelling;  -      Time 5 10 mins  -            User Key  (r) = Recorded By, (t) = Taken By, (c) = Cosigned By    Initials Name Provider Type    Tonya Hatfield PTA Physical Therapist Assistant                              PT OP Goals     Row Name 08/05/22 1300          PT Short Term Goals    STG Date to Achieve 08/09/22  -     STG 1 Pt’ LEFS score will improve by at least 18 points.  -     STG 1 Progress Met  -     STG 2 Pt’s knee ext AROM will improve to 0 deg.  -     STG 2 Progress Not Met;Progressing  -     STG 3 Pt’s knee flex AROM will improve to at least 90 deg.  -     STG 3 Progress Met  -     STG 4 Pt will be able to ambulate at least 600 feet with an assistive device and without significant gait deviations for safe community ambulation.  -     STG 4 Progress Not Met;Progressing  -            Long Term Goals    LTG Date to Achieve 08/23/22  -     LTG 1 Pt will be independent with final HEP for self-management of symptoms.  -     LTG 1 Progress Not Met  -     LTG 2 Pt will report a subjective improvement of at least 75% in symptoms as a measure to return to PLOF.  -     LTG 2 Progress Not Met  -     LTG 3 Pt will be independent with all ADLs and IADLs as a measure of return to PLOF.  -     LTG 3 Progress Partially Met  -     LTG 4 Pt’s " knee flex AROM will improve to at least 120 deg.  -     LTG 4 Progress Not Met  -     LTG 5 Pt’s knee flex/ext MMT will improve to 5/5 as a measure of improved strength.  -     LTG 5 Progress Not Met  -     LTG 6 Pt will be able to ambulate at least 600 feet without an assistive device and without gait deviations for safe community ambulation.  -     LTG 6 Progress Not Met  -            Time Calculation    PT Goal Re-Cert Due Date 08/09/22  -           User Key  (r) = Recorded By, (t) = Taken By, (c) = Cosigned By    Initials Name Provider Type    Tonya Hatfield PTA Physical Therapist Assistant                               Time Calculation:   Start Time: 1303  Stop Time: 1400  Time Calculation (min): 57 min  Untimed Charges  PT E-Stim Unattended Minutes: 15  PT Ice Rx Minutes: 15  Total Minutes  Untimed Charges Total Minutes: 30   Total Minutes: 30  Therapy Charges for Today     Code Description Service Date Service Provider Modifiers Qty    53258263439 HC PT THER SUPP EA 15 MIN 8/5/2022 Tonya Castañeda PTA GP 1    57377292083 HC PT ELECTRICAL STIM UNATTENDED 8/5/2022 Tonya Castañeda PTA CQ 1    95451680248 HC PT MANUAL THERAPY EA 15 MIN 8/5/2022 Tonya Castañeda PTA GP, CQ 1    70541194227 HC PT THER PROC EA 15 MIN 8/5/2022 Tonya Castañeda PTA GP, CQ 2                    Tonya Castañeda PTA  8/5/2022

## 2022-08-09 ENCOUNTER — HOSPITAL ENCOUNTER (OUTPATIENT)
Dept: PHYSICAL THERAPY | Facility: HOSPITAL | Age: 62
Setting detail: THERAPIES SERIES
Discharge: HOME | End: 2022-08-09

## 2022-08-09 DIAGNOSIS — Z96.651 STATUS POST TOTAL KNEE REPLACEMENT, RIGHT: Primary | ICD-10-CM

## 2022-08-09 PROCEDURE — 97110 THERAPEUTIC EXERCISES: CPT

## 2022-08-09 PROCEDURE — G0283 ELEC STIM OTHER THAN WOUND: HCPCS

## 2022-08-09 NOTE — THERAPY TREATMENT NOTE
Outpatient Physical Therapy Ortho Treatment Note  [facility]     Patient Name: Mary Alice Garcia  : 1960  MRN: [id number]  Today's Date: 2022      Visit Date: 2022     Attendance:  (45 approved)  Subjective Improvement: 50%  Next MD Visit: 6 weeks  Recert Date: 22     Therapy Diagnosis: S/P R TKA on 22    Visit Dx:    ICD-10-CM ICD-9-CM   1. Status post total knee replacement, right  Z96.651 V43.65       Patient Active Problem List   Diagnosis   • Agoraphobia   • Arthropathy, multiple sites   • Bipolar disorder (HCC)   • Depressive disorder, not elsewhere classified   • Esophageal reflux   • Generalized osteoarthritis of multiple sites   • Mood disorder (HCC)   • Obesity   • Sialoadenitis   • Thoracic or lumbosacral neuritis or radiculitis, unspecified   • Venous insufficiency   • Vitamin B 12 deficiency        No past medical history on file.     Past Surgical History:   Procedure Laterality Date   •  SECTION     • COLONOSCOPY N/A 10/22/2018    Procedure: COLONOSCOPY;  Surgeon: Christiano Durbin DO;  Location: [facility] ENDOSCOPY;  Service: Gastroenterology   • JOINT REPLACEMENT Left     knee   • LAPAROSCOPIC GASTRIC BANDING     • REPLACEMENT TOTAL KNEE     • TUBAL ABDOMINAL LIGATION          PT Ortho     Row Name 22 1300       Subjective Comments    Subjective Comments MD pleased with progress' REports that since last visit she has had pain in her L buttocks; Not using cane anymore; Still gets stiff on her;  -MACI       Precautions and Contraindications    Contraindications R TKA on 22. L TKA 6 years ago.  -       Posture/Observations    Posture/Observations Comments no AD; good stride; incision well healed  -KH       Right Lower Ext    Rt Knee Extension/Flexion AROM 0-115°  -          User Key  (r) = Recorded By, (t) = Taken By, (c) = Cosigned By    Initials Name Provider Type    Tonya Hatfield PTA Physical Therapist Assistant           "                   PT Assessment/Plan     Row Name 08/09/22 1300          PT Assessment    Functional Limitations Impaired gait;Impaired locomotion;Limitation in home management;Performance in self-care ADL;Performance in leisure activities  -     Impairments Balance;Edema;Gait;Impaired flexibility;Impaired muscle endurance;Impaired muscle length;Impaired muscle power;Joint integrity;Joint mobility;Muscle strength;Pain;Poor body mechanics;Range of motion;Sensation  -     Assessment Comments improved flexion and scar mobility; progressing well towards unmet goals;  -KH     Rehab Potential Good  -     Patient/caregiver participated in establishment of treatment plan and goals Yes  -     Patient would benefit from skilled therapy intervention Yes  -KH            PT Plan    PT Frequency 2x/week  -     Predicted Duration of Therapy Intervention (PT) 4 more weeks  -     PT Plan Comments Recheck next visit; Continue as advised; Progress strength and ROM of the R knee;  -           User Key  (r) = Recorded By, (t) = Taken By, (c) = Cosigned By    Initials Name Provider Type    Tonya Hatfield PTA Physical Therapist Assistant                   OP Exercises     Row Name 08/09/22 1300             Subjective Comments    Subjective Comments MD pleased with progress' REports that since last visit she has had pain in her L buttocks; Not using cane anymore; Still gets stiff on her;  -              Subjective Pain    Able to rate subjective pain? yes  -      Pre-Treatment Pain Level 2  -      Post-Treatment Pain Level 1  -              Exercise 1    Exercise Name 1 pro ll LE strength/ROM  -      Time 1 10 mins  -      Additional Comments level 3; seat 11  -              Exercise 2    Exercise Name 2 seated piriformis stretch B  -KH      Sets 2 2  -KH      Time 2 30\" holds  -KH              Exercise 3    Exercise Name 3 BOSU step ups fwd and lat  -KH      Sets 3 2  -KH      Reps 3 10 each  -KH              " Exercise 4    Exercise Name 4 Cybex Leg Press  -      Sets 4 3  -      Reps 4 10  -KH      Additional Comments 100#  -              Exercise 5    Exercise Name 5 cybex hip abd/add  -      Sets 5 3  -      Reps 5 10  -KH      Additional Comments 30#  -              Exercise 6    Exercise Name 6 scar massage  -      Time 6 3 mins  -            User Key  (r) = Recorded By, (t) = Taken By, (c) = Cosigned By    Initials Name Provider Type    Tonya Hatfield PTA Physical Therapist Assistant                              PT OP Goals     Row Name 08/09/22 1300          PT Short Term Goals    STG Date to Achieve 08/09/22  -     STG 1 Pt’ LEFS score will improve by at least 18 points.  -     STG 1 Progress Met  -     STG 2 Pt’s knee ext AROM will improve to 0 deg.  -     STG 2 Progress Not Met;Progressing  -     STG 3 Pt’s knee flex AROM will improve to at least 90 deg.  -     STG 3 Progress Met  -     STG 4 Pt will be able to ambulate at least 600 feet with an assistive device and without significant gait deviations for safe community ambulation.  -     STG 4 Progress Not Met;Progressing  -            Long Term Goals    LTG Date to Achieve 08/23/22  -     LTG 1 Pt will be independent with final HEP for self-management of symptoms.  -     LTG 1 Progress Not Met  -     LTG 2 Pt will report a subjective improvement of at least 75% in symptoms as a measure to return to PLOF.  -     LTG 2 Progress Not Met  -     LTG 3 Pt will be independent with all ADLs and IADLs as a measure of return to PLOF.  -     LTG 3 Progress Partially Met  -     LTG 4 Pt’s knee flex AROM will improve to at least 120 deg.  -     LTG 4 Progress Not Met  -     LTG 5 Pt’s knee flex/ext MMT will improve to 5/5 as a measure of improved strength.  -     LTG 5 Progress Not Met  -     LTG 6 Pt will be able to ambulate at least 600 feet without an assistive device and without gait deviations for safe community  ambulation.  -MACI     LTG 6 Progress Not Met  -MACI            Time Calculation    PT Goal Re-Cert Due Date 08/09/22  -MACI           User Key  (r) = Recorded By, (t) = Taken By, (c) = Cosigned By    Initials Name Provider Type    Tonya Hatfield PTA Physical Therapist Assistant                               Time Calculation:   Start Time: 1352  Stop Time: 1445  Time Calculation (min): 53 min  Therapy Charges for Today     Code Description Service Date Service Provider Modifiers Qty    75519321694 HC PT THER SUPP EA 15 MIN 8/9/2022 Tonya Castañeda PTA GP 1    65214797901 HC PT ELECTRICAL STIM UNATTENDED 8/9/2022 Tonya Castañeda PTA CQ 1    12437157476 HC PT THER PROC EA 15 MIN 8/9/2022 Tonya Castañeda PTA GP, CQ 3                    Tonya Castañeda PTA  8/9/2022

## 2022-08-12 ENCOUNTER — APPOINTMENT (OUTPATIENT)
Dept: PHYSICAL THERAPY | Facility: HOSPITAL | Age: 62
End: 2022-08-12

## 2022-08-16 ENCOUNTER — HOSPITAL ENCOUNTER (OUTPATIENT)
Dept: PHYSICAL THERAPY | Facility: HOSPITAL | Age: 62
Setting detail: THERAPIES SERIES
Discharge: HOME | End: 2022-08-16

## 2022-08-16 DIAGNOSIS — Z96.651 STATUS POST TOTAL KNEE REPLACEMENT, RIGHT: Primary | ICD-10-CM

## 2022-08-16 PROCEDURE — G0283 ELEC STIM OTHER THAN WOUND: HCPCS

## 2022-08-16 PROCEDURE — 97110 THERAPEUTIC EXERCISES: CPT

## 2022-08-16 NOTE — THERAPY TREATMENT NOTE
Outpatient Physical Therapy Ortho Treatment Note  [facility]     Patient Name: Mary Alice Garcia  : 1960  MRN: [id number]  Today's Date: 2022      Visit Date: 2022     Attendance: 15/15 (45 approved)  Subjective Improvement: 50%  Next MD Visit: 5 weeks  Recert Date: 22     Therapy Diagnosis: S/P R TKA on 22    Visit Dx:    ICD-10-CM ICD-9-CM   1. Status post total knee replacement, right  Z96.651 V43.65       Patient Active Problem List   Diagnosis   • Agoraphobia   • Arthropathy, multiple sites   • Bipolar disorder (HCC)   • Depressive disorder, not elsewhere classified   • Esophageal reflux   • Generalized osteoarthritis of multiple sites   • Mood disorder (HCC)   • Obesity   • Sialoadenitis   • Thoracic or lumbosacral neuritis or radiculitis, unspecified   • Venous insufficiency   • Vitamin B 12 deficiency        No past medical history on file.     Past Surgical History:   Procedure Laterality Date   •  SECTION     • COLONOSCOPY N/A 10/22/2018    Procedure: COLONOSCOPY;  Surgeon: Christiano Durbin DO;  Location: [facility] ENDOSCOPY;  Service: Gastroenterology   • JOINT REPLACEMENT Left     knee   • LAPAROSCOPIC GASTRIC BANDING     • REPLACEMENT TOTAL KNEE     • TUBAL ABDOMINAL LIGATION          PT Ortho     Row Name 22 1500       Precautions and Contraindications    Contraindications R TKA on 22. L TKA 6 years ago.  -KH       Subjective Pain    Post-Treatment Pain Level 0  -KH       Posture/Observations    Posture/Observations Comments good stride with gait no ad  -KH       Right Lower Ext    Rt Knee Extension/Flexion AROM 0-120° SUPINE  -KH          User Key  (r) = Recorded By, (t) = Taken By, (c) = Cosigned By    Initials Name Provider Type    Tonya Hatfield PTA Physical Therapist Assistant                             PT Assessment/Plan     Row Name 22 1500          PT Assessment    Functional Limitations Impaired gait;Impaired  locomotion;Limitation in home management;Performance in self-care ADL;Performance in leisure activities  -     Impairments Balance;Edema;Gait;Impaired flexibility;Impaired muscle endurance;Impaired muscle length;Impaired muscle power;Joint integrity;Joint mobility;Muscle strength;Pain;Poor body mechanics;Range of motion;Sensation  -     Assessment Comments improved ROM in the R knee; alignment corrected with improved hip pain after manual; increased HEP to include hip and core strength this date  -     Rehab Potential Good  -     Patient/caregiver participated in establishment of treatment plan and goals Yes  -     Patient would benefit from skilled therapy intervention Yes  -KH            PT Plan    PT Frequency 2x/week  -     Predicted Duration of Therapy Intervention (PT) 4 more weeks  -     PT Plan Comments Recheck next visit; Possible d/c soon;  -           User Key  (r) = Recorded By, (t) = Taken By, (c) = Cosigned By    Initials Name Provider Type    Tonya Hatfield PTA Physical Therapist Assistant                 Modalities     Row Name 08/16/22 1500             Ice    Ice Applied Yes  -      Location right knee with IF  -      PT Ice Rx Minutes 15  -KH      Ice S/P Rx Yes  -              ELECTRICAL STIMULATION    Attended/Unattended Unattended  -      Stimulation Type IFC  -KH      Location/Electrode Placement/Other right knee with ice  -      PT E-Stim Unattended Minutes 15  -KH            User Key  (r) = Recorded By, (t) = Taken By, (c) = Cosigned By    Initials Name Provider Type    Tonya Hatfield PTA Physical Therapist Assistant               OP Exercises     Row Name 08/16/22 1500             Subjective Comments    Subjective Comments Patient reports that her hips are sore from mowing the yard yesterday; Knee is doing well  -              Subjective Pain    Able to rate subjective pain? yes  -KH      Pre-Treatment Pain Level 0  -KH      Post-Treatment Pain Level 0  -KH    "           Exercise 1    Exercise Name 1 pro ll LE strength/ROM  -KH      Time 1 10 mins  -KH      Additional Comments level 3; seat 11  -KH              Exercise 2    Exercise Name 2 shown tib/fin mobes for HEP  -KH      Time 2 2 mins  -KH              Exercise 3    Exercise Name 3 recipercal stairs (3/2 small in rehab area) w/ 1 HR  -KH      Reps 3 10 trips <->  -KH      Additional Comments pain in the L hip  -KH              Exercise 4    Exercise Name 4 Clam shells w/ band B  -KH      Sets 4 2  -KH      Reps 4 10 each  -KH      Additional Comments Red T-Band  -KH              Exercise 5    Exercise Name 5 Reverse Clam Shells  -KH      Sets 5 2  -KH      Reps 5 10 each B  -KH      Additional Comments Red T-Band  -KH              Exercise 6    Exercise Name 6 Bridges  -KH      Sets 6 2  -KH      Reps 6 10  -KH      Time 6 3\" holds  -KH              Exercise 7    Exercise Name 7 ME to correct anterior torison on R/postreior on L  -KH            User Key  (r) = Recorded By, (t) = Taken By, (c) = Cosigned By    Initials Name Provider Type    Tonya Hatfield PTA Physical Therapist Assistant                              PT OP Goals     Row Name 08/16/22 1500          PT Short Term Goals    STG Date to Achieve 08/09/22  -     STG 1 Pt’ LEFS score will improve by at least 18 points.  -     STG 1 Progress Met  -     STG 2 Pt’s knee ext AROM will improve to 0 deg.  -     STG 2 Progress Not Met;Progressing  -     STG 3 Pt’s knee flex AROM will improve to at least 90 deg.  -     STG 3 Progress Met  -     STG 4 Pt will be able to ambulate at least 600 feet with an assistive device and without significant gait deviations for safe community ambulation.  -     STG 4 Progress Not Met;Progressing  -            Long Term Goals    LTG Date to Achieve 08/23/22  -     LTG 1 Pt will be independent with final HEP for self-management of symptoms.  -     LTG 1 Progress Not Met  -     LTG 2 Pt will report a " subjective improvement of at least 75% in symptoms as a measure to return to PLOF.  -     LTG 2 Progress Not Met  -     LTG 3 Pt will be independent with all ADLs and IADLs as a measure of return to PLOF.  -     LTG 3 Progress Partially Met  -     LTG 4 Pt’s knee flex AROM will improve to at least 120 deg.  -     LTG 4 Progress Not Met  -     LTG 5 Pt’s knee flex/ext MMT will improve to 5/5 as a measure of improved strength.  -     LTG 5 Progress Not Met  -     LTG 6 Pt will be able to ambulate at least 600 feet without an assistive device and without gait deviations for safe community ambulation.  -     LTG 6 Progress Not Met  -            Time Calculation    PT Goal Re-Cert Due Date 08/09/22  -           User Key  (r) = Recorded By, (t) = Taken By, (c) = Cosigned By    Initials Name Provider Type    Tonya Hatfield PTA Physical Therapist Assistant                Therapy Education  Education Details: bridge, clam shells and reverse clams with Tband; Tib Fib Mobes  Given: HEP  Program: New  How Provided: Verbal, Demonstration, Written  Provided to: Patient  Level of Understanding: Teach back education performed, Verbalized, Demonstrated              Time Calculation:   Start Time: 1522  Stop Time: 1620  Time Calculation (min): 58 min  Untimed Charges  PT E-Stim Unattended Minutes: 15  PT Ice Rx Minutes: 15  Total Minutes  Untimed Charges Total Minutes: 30   Total Minutes: 30  Therapy Charges for Today     Code Description Service Date Service Provider Modifiers Qty    65967216079 HC PT THER SUPP EA 15 MIN 8/16/2022 Tonya Castañeda PTA GP 1    65884105830 HC PT ELECTRICAL STIM UNATTENDED 8/16/2022 Tonya Castañeda PTA CQ 1    00896080183 HC PT THER PROC EA 15 MIN 8/16/2022 Tonya Castañeda PTA GP, CQ 3                    Tonya Castañeda PTA  8/16/2022

## 2022-08-18 ENCOUNTER — HOSPITAL ENCOUNTER (OUTPATIENT)
Dept: PHYSICAL THERAPY | Facility: HOSPITAL | Age: 62
Setting detail: THERAPIES SERIES
Discharge: HOME | End: 2022-08-18

## 2022-08-18 DIAGNOSIS — Z96.651 STATUS POST TOTAL KNEE REPLACEMENT, RIGHT: Primary | ICD-10-CM

## 2022-08-18 PROCEDURE — 97110 THERAPEUTIC EXERCISES: CPT

## 2022-08-18 NOTE — THERAPY DISCHARGE NOTE
Outpatient Physical Therapy Ortho Progress Note/Discharge Summary  [facility]     Patient Name: Mary Alice Garcia  : 1960  MRN: [id number]  Today's Date: 2022      Visit Date: 2022  Attendance: 15/15 (45 approved)  Subjective Improvement: 90%  Next MD Visit: 5 weeks  Recert Date: d/c     Therapy Diagnosis: S/P R TKA on 22    Visit Dx:    ICD-10-CM ICD-9-CM   1. Status post total knee replacement, right  Z96.651 V43.65       Patient Active Problem List   Diagnosis   • Agoraphobia   • Arthropathy, multiple sites   • Bipolar disorder (HCC)   • Depressive disorder, not elsewhere classified   • Esophageal reflux   • Generalized osteoarthritis of multiple sites   • Mood disorder (HCC)   • Obesity   • Sialoadenitis   • Thoracic or lumbosacral neuritis or radiculitis, unspecified   • Venous insufficiency   • Vitamin B 12 deficiency        History reviewed. No pertinent past medical history.     Past Surgical History:   Procedure Laterality Date   •  SECTION     • COLONOSCOPY N/A 10/22/2018    Procedure: COLONOSCOPY;  Surgeon: Christiano Durbin DO;  Location: [facility] ENDOSCOPY;  Service: Gastroenterology   • JOINT REPLACEMENT Left     knee   • LAPAROSCOPIC GASTRIC BANDING     • REPLACEMENT TOTAL KNEE     • TUBAL ABDOMINAL LIGATION          PT Ortho     Row Name 22 1400       Subjective Comments    Subjective Comments Pt stated that overall, she is doing well. Pt reports that she cont to experience stiffness in the knee. 90% subjective improvement.  -MH       Precautions and Contraindications    Contraindications R TKA on 22. L TKA 6 years ago.  -MH       Subjective Pain    Able to rate subjective pain? yes  -MH    Pre-Treatment Pain Level 0  -MH    Post-Treatment Pain Level 0  -MH       Posture/Observations    Posture/Observations Comments Mild edema. TTP medial knee grossly  -MH       Right Lower Ext    Rt Knee Extension/Flexion AROM 0-118 deg  -MH       MMT Right  Lower Ext    Rt Hip Flexion MMT, Gross Movement (4+/5) good plus  -MH    Rt Hip Extension MMT, Gross Movement (5/5) normal  -MH    Rt Hip ABduction MMT, Gross Movement (5/5) normal  -MH    Rt Hip ADduction MMT, Gross Movement (5/5) normal  -MH    Rt Hip Internal (Medial) Rotation MMT, Gross Movement (5/5) normal  -MH    Rt Hip External (Lateral) Rotation MMT, Gross Movement (5/5) normal  -MH    Rt Knee Extension MMT, Gross Movement (5/5) normal  -MH    Rt Knee Flexion MMT, Gross Movement (5/5) normal  -MH    Rt Ankle Dorsiflexion MMT, Gross Movement (5/5) normal  -MH    Rt Lower Extremity Comments  No quad lag with SLR  -       Sensation    Sensation WNL? WNL  -       Lower Extremity Flexibility    Hamstrings Right:;WNL  -    Gastrocnemius Right:;WNL  -       Balance Skills Training    SLS RLE: 6 sec  -    Sharpened [place]omberg RLE retro: 14 sec  -       Gait/Stairs (Locomotion)    [place] Level (Gait) independent  -    Comment, (Gait/Stairs) No significant gait deviations observed.  -          User Key  (r) = Recorded By, (t) = Taken By, (c) = Cosigned By    Initials Name Provider Type     Anum Wesley, PT Physical Therapist                             PT Assessment/Plan     Row Name 08/18/22 1400          PT Assessment    Functional Limitations --  -     Impairments Balance;Edema;Range of motion;Muscle strength  -     Assessment Comments Recheck completed this visit. Pt has met 9/10 goals to date. Only remaining goal is for knee flex AROM to 120 deg. Pt has measured at 120 deg in the past but only measure 118 deg today. Pt has demonstrated improvements in ROM, LE strength, flexibility, balance, and gait. Pt reports 90% subjective improvement. At this time, pt is d/c from skilled PT to home with HEP. Time was spent reviewing and updating pt’s HEP.  -     Rehab Potential Good  -     Patient/caregiver participated in establishment of treatment plan and goals Yes  -     Patient would  benefit from skilled therapy intervention --  -            PT Plan    Predicted Duration of Therapy Intervention (PT) d/c  -     PT Plan Comments d/c home with [facility]  -           User Key  (r) = Recorded By, (t) = Taken By, (c) = Cosigned By    Initials Name Provider Type    Anum Wise, PT Physical Therapist                     OP Exercises     Row Name 08/18/22 1400             Subjective Comments    Subjective Comments Pt stated that overall, she is doing well. Pt reports that she cont to experience stiffness in the knee. 90% subjective improvement.  -              Subjective Pain    Able to rate subjective pain? yes  -      Pre-Treatment Pain Level 0  -      Post-Treatment Pain Level 0  -              Total Minutes    26760 - PT Therapeutic Exercise Minutes 45  -              Exercise 1    Exercise Name 1 pro ll LE strength/ROM  -      Time 1 10 mins  -      Additional Comments lvl 4; seat 10  -              Exercise 2    Exercise Name 2 Recheck  -              Exercise 3    Exercise Name 3 SLR flex  -      Sets 3 1  -      Reps 3 20  -              Exercise 4    Exercise Name 4 SLS balance  -      Sets 4 4  -MH      Time 4 4-6 sec hold  -              Exercise 5    Exercise Name 5 Seated resisted marches  -      Sets 5 1  -MH      Reps 5 20  -              Exercise 6    Exercise Name 6 ITB S  -      Sets 6 3  -MH      Time 6 30 sec hold  -              Exercise 7    Exercise Name 7 Gait around clinic  -      Reps 7 600 feet  -              Exercise 8    Exercise Name 8 HEP review  -            User Key  (r) = Recorded By, (t) = Taken By, (c) = Cosigned By    Initials Name Provider Type    Anum Wise, PT Physical Therapist                                PT OP Goals     Row Name 08/18/22 1400          PT Short Term Goals    STG Date to Achieve --  All STG met  -     STG 1 Pt’ LEFS score will improve by at least 18 points.  -     STG 1 Progress Met   -     STG 2 Pt’s knee ext AROM will improve to 0 deg.  -     STG 2 Progress Met   -     STG 3 Pt’s knee flex AROM will improve to at least 90 deg.  -     STG 3 Progress Met  -     STG 4 Pt will be able to ambulate at least 600 feet with an assistive device and without significant gait deviations for safe community ambulation.  -     STG 4 Progress Met   [facility]            Long Term Goals    LTG Date to Achieve --  -     LTG 1 Pt will be independent with final HEP for self-management of symptoms.  -     LTG 1 Progress Met   -     LTG 2 Pt will report a subjective improvement of at least 75% in symptoms as a measure to return to PLOF.  -     LTG 2 Progress Met   [facility]     LTG 3 Pt will be independent with all ADLs and IADLs as a measure of return to PLOF.  -     LTG 3 Progress Met   [facility]     LTG 4 Pt’s knee flex AROM will improve to at least 120 deg.  -     LTG 4 Progress Not Met  [facility]     LTG 5 Pt’s knee flex/ext MMT will improve to 5/5 as a measure of improved strength.  -     LTG 5 Progress Met   [facility]     LTG 6 Pt will be able to ambulate at least 600 feet without an assistive device and without gait deviations for safe community ambulation.  -     LTG 6 Progress Met   [facility]            Time Calculation    PT Goal Re-Cert Due Date --  d/c  -           User Key  (r) = Recorded By, (t) = Taken By, (c) = Cosigned By    Initials Name Provider Type    Anum Wise, PT Physical Therapist                Therapy Education  Education Details: HEP: Hip flex with SONALI mckeon  Given: HEP  Program: New  How Provided: Verbal, Demonstration, Written  Provided to: Patient  Level of Understanding: Teach back education performed, Verbalized, Demonstrated    Outcome Measure Options: Lower Extremity Functional Scale (LEFS)  Lower Extremity Functional Index  Any of your usual work, housework or school activities: No difficulty  Your usual hobbies, recreational or sporting activities: No difficulty  Getting into  or out of the bath: No difficulty  Walking between rooms: No difficulty  Putting on your shoes or socks: No difficulty  Squatting: A little bit of difficulty  Lifting an object, like a bag of groceries from the floor: No difficulty  Performing light activities around your home: No difficulty  Performing heavy activities around your home: No difficulty  Getting into or out of a car: No difficulty  Walking 2 blocks: No difficulty  Walking a mile: Moderate difficulty  Going up or down 10 stairs (about 1 flight of stairs): Quite a bit of difficulty  Standing for 1 hour: Moderate difficulty  Sitting for 1 hour: No difficulty  Running on even ground: Extreme difficulty or unable to perform activity  Running on uneven ground: Extreme difficulty or unable to perform activity  Making sharp turns while running fast: Extreme difficulty or unable to perform activity  Hopping: Extreme difficulty or unable to perform activity  Rolling over in bed: No difficulty  Total: 56      Time Calculation:   Start Time: 1430  Stop Time: 1515  Time Calculation (min): 45 min  Timed Charges  96648 - PT Therapeutic Exercise Minutes: 45  Total Minutes  Timed Charges Total Minutes: 45   Total Minutes: 45  Therapy Charges for Today     Code Description Service Date Service Provider Modifiers Qty    02713824017 HC PT THER PROC EA 15 MIN 8/18/2022 Anum Wesley, PT GP 3          PT G-Codes  Outcome Measure Options: Lower Extremity Functional Scale (LEFS)  Total: 56     OP PT Discharge Summary  Date of Discharge: 08/18/22  Reason for Discharge: Independent  Outcomes Achieved: Patient able to partially acheive established goals, Refer to plan of care for updates on goals achieved  Discharge Destination: Home with home program      Anum Wesley PT, DPT  8/18/2022

## 2022-08-23 ENCOUNTER — APPOINTMENT (OUTPATIENT)
Dept: PHYSICAL THERAPY | Facility: HOSPITAL | Age: 62
End: 2022-08-23

## 2022-08-25 ENCOUNTER — APPOINTMENT (OUTPATIENT)
Dept: PHYSICAL THERAPY | Facility: HOSPITAL | Age: 62
End: 2022-08-25

## 2022-11-04 ENCOUNTER — PREP FOR SURGERY (OUTPATIENT)
Dept: OTHER | Facility: HOSPITAL | Age: 62
End: 2022-11-04

## 2022-11-04 DIAGNOSIS — Z86.010 PERSONAL HISTORY OF COLONIC POLYPS: Primary | ICD-10-CM

## 2022-11-04 RX ORDER — DEXTROSE AND SODIUM CHLORIDE 5; .45 G/100ML; G/100ML
30 INJECTION, SOLUTION INTRAVENOUS CONTINUOUS PRN
Status: CANCELLED | OUTPATIENT
Start: 2022-12-12

## 2022-12-09 RX ORDER — DULOXETIN HYDROCHLORIDE 60 MG/1
60 CAPSULE, DELAYED RELEASE ORAL NIGHTLY
COMMUNITY

## 2022-12-09 RX ORDER — ESOMEPRAZOLE MAGNESIUM 40 MG/1
40 CAPSULE, DELAYED RELEASE ORAL NIGHTLY
COMMUNITY

## 2022-12-09 RX ORDER — FERROUS SULFATE TAB EC 324 MG (65 MG FE EQUIVALENT) 324 (65 FE) MG
324 TABLET DELAYED RESPONSE ORAL EVERY OTHER DAY
COMMUNITY

## 2022-12-12 ENCOUNTER — ANESTHESIA EVENT (OUTPATIENT)
Dept: GASTROENTEROLOGY | Facility: HOSPITAL | Age: 62
End: 2022-12-12

## 2022-12-12 ENCOUNTER — HOSPITAL ENCOUNTER (OUTPATIENT)
Facility: HOSPITAL | Age: 62
Setting detail: HOSPITAL OUTPATIENT SURGERY
Discharge: HOME | End: 2022-12-12
Attending: INTERNAL MEDICINE | Admitting: INTERNAL MEDICINE

## 2022-12-12 ENCOUNTER — ANESTHESIA (OUTPATIENT)
Dept: GASTROENTEROLOGY | Facility: HOSPITAL | Age: 62
End: 2022-12-12

## 2022-12-12 VITALS
SYSTOLIC BLOOD PRESSURE: 101 MMHG | HEIGHT: 69 IN | HEART RATE: 65 BPM | DIASTOLIC BLOOD PRESSURE: 57 MMHG | BODY MASS INDEX: 33.03 KG/M2 | RESPIRATION RATE: 18 BRPM | WEIGHT: 223 LBS | OXYGEN SATURATION: 95 % | TEMPERATURE: 96.5 F

## 2022-12-12 DIAGNOSIS — Z86.010 PERSONAL HISTORY OF COLONIC POLYPS: ICD-10-CM

## 2022-12-12 PROCEDURE — 25010000002 PROPOFOL 10 MG/ML EMULSION

## 2022-12-12 RX ORDER — DEXTROSE AND SODIUM CHLORIDE 5; .45 G/100ML; G/100ML
30 INJECTION, SOLUTION INTRAVENOUS CONTINUOUS PRN
Status: DISCONTINUED | OUTPATIENT
Start: 2022-12-12 | End: 2022-12-12 | Stop reason: HOSPADM

## 2022-12-12 RX ORDER — PROPOFOL 10 MG/ML
VIAL (ML) INTRAVENOUS AS NEEDED
Status: DISCONTINUED | OUTPATIENT
Start: 2022-12-12 | End: 2022-12-12 | Stop reason: SURG

## 2022-12-12 RX ORDER — LIDOCAINE HYDROCHLORIDE 20 MG/ML
INJECTION, SOLUTION INTRAVENOUS AS NEEDED
Status: DISCONTINUED | OUTPATIENT
Start: 2022-12-12 | End: 2022-12-12 | Stop reason: SURG

## 2022-12-12 RX ADMIN — PROPOFOL 100 MG: 10 INJECTION, EMULSION INTRAVENOUS at 15:45

## 2022-12-12 RX ADMIN — LIDOCAINE HYDROCHLORIDE 50 MG: 20 INJECTION, SOLUTION INTRAVENOUS at 15:40

## 2022-12-12 RX ADMIN — PROPOFOL 50 MG: 10 INJECTION, EMULSION INTRAVENOUS at 15:49

## 2022-12-12 RX ADMIN — PROPOFOL 50 MG: 10 INJECTION, EMULSION INTRAVENOUS at 15:44

## 2022-12-12 RX ADMIN — DEXTROSE AND SODIUM CHLORIDE 30 ML/HR: 5; 450 INJECTION, SOLUTION INTRAVENOUS at 14:49

## 2022-12-12 RX ADMIN — PROPOFOL 50 MG: 10 INJECTION, EMULSION INTRAVENOUS at 15:51

## 2022-12-12 RX ADMIN — PROPOFOL 50 MG: 10 INJECTION, EMULSION INTRAVENOUS at 15:54

## 2022-12-12 RX ADMIN — PROPOFOL 80 MG: 10 INJECTION, EMULSION INTRAVENOUS at 15:40

## 2022-12-12 NOTE — ANESTHESIA PREPROCEDURE EVALUATION
Anesthesia Evaluation     Patient summary reviewed and Nursing notes reviewed   no history of anesthetic complications:  NPO Solid Status: > 8 hours  NPO Liquid Status: > 2 hours           Airway   Mallampati: II  TM distance: >3 FB  Neck ROM: full  No difficulty expected  Dental    (+) poor dentition    Pulmonary    Cardiovascular - negative cardio ROS        Neuro/Psych  (+) numbness, psychiatric history Bipolar and Depression,    GI/Hepatic/Renal/Endo    (+) obesity,  GERD well controlled,      Musculoskeletal     Abdominal    Substance History      OB/GYN          Other   arthritis,                    Anesthesia Plan    ASA 2     general   total IV anesthesia  intravenous induction     Anesthetic plan, risks, benefits, and alternatives have been provided, discussed and informed consent has been obtained with: patient.    Plan discussed with CRNA.        CODE STATUS:

## 2022-12-12 NOTE — ANESTHESIA POSTPROCEDURE EVALUATION
Patient: Mary Alice Garcia    Procedure Summary     Date: 12/12/22 Room / Location: [facility] ENDOSCOPY 2 / [facility] ENDOSCOPY    Anesthesia Start: 1534 Anesthesia Stop: 1556    Procedure: COLONOSCOPY 3:00 Diagnosis:       Personal history of colonic polyps      (Personal history of colonic polyps [Z86.010])    Surgeons: Christiano Durbin DO Provider: Rossana Rodríguez CRNA    Anesthesia Type: general ASA Status: 2          Anesthesia Type: general    Vitals  No vitals data found for the desired time range.          Post Anesthesia Care and Evaluation    Patient location during evaluation: PHASE II  Patient participation: complete - patient cannot participate  Level of consciousness: sleepy but conscious  Pain score: 0  Pain management: adequate    Airway patency: patent  Anesthetic complications: No anesthetic complications  PONV Status: none  Cardiovascular status: acceptable  Respiratory status: acceptable  Hydration status: acceptable  No anesthesia care post op

## 2022-12-12 NOTE — H&P
Asad Hurtado DO,[facility]  Gastroenterology  Hepatology  Endoscopy  Board Certified in Internal Medicine and gastroenterology  [address], [address]  [place]. [address]  - [phone]   F - [phone]     GASTROENTEROLOGY HISTORY AND PHYSICAL  NOTE   ASAD HURTADO DO.         SUBJECTIVE:   2022    Name: Mary Alice Garcia  DOD: 1960    Chief Complaint:       Subjective : Personal history of colon polyps    Patient is 62 y.o. female presents with desire for elective colonoscopy.      ROS/HISTORY/ CURRENT MEDICATIONS/OBJECTIVE/VS/PE:   Review of Systems:  All systems unremarkable unless specified below.  Constitutional   HENT  Eyes   Respiratory    Cardiovascular  Gastrointestinal   Endocrine  Genitourinary    Musculoskeletal   Skin  Allergic/Immunologic    Neurological    Hematological  Psychiatric/Behavioral    History:     Past Medical History:   Diagnosis Date   • GERD (gastroesophageal reflux disease)    • History of transfusion      Past Surgical History:   Procedure Laterality Date   •  SECTION     • COLONOSCOPY N/A 10/22/2018    Procedure: COLONOSCOPY;  Surgeon: Asad Hurtado DO;  Location: [facility] ENDOSCOPY;  Service: Gastroenterology   • LAPAROSCOPIC GASTRIC BANDING     • REPLACEMENT TOTAL KNEE Bilateral    • TONSILLECTOMY     • TUBAL ABDOMINAL LIGATION       History reviewed. No pertinent family history.  Social History     Tobacco Use   • Smoking status: Former     Types: Cigarettes     Quit date:      Years since quittin.9   • Smokeless tobacco: Never   Vaping Use   • Vaping Use: Never used   Substance Use Topics   • Alcohol use: Not Currently   • Drug use: Never     Prior to Admission medications    Medication Sig Start Date End Date Taking? Authorizing Provider   Cholecalciferol 5000 units tablet Take 5,000 Units by mouth Every Night.   Yes Emergency, Nurse Jason, RN   DULoxetine (CYMBALTA) 60 MG capsule Take 60 mg by mouth Every Night.   Yes  ProviderSalas MD   escitalopram (LEXAPRO) 10 MG tablet Take 10 mg by mouth Daily.   Yes Salas Garvin MD   esomeprazole (nexIUM) 40 MG capsule Take 40 mg by mouth Every Night.   Yes Salas Garvin MD   ferrous sulfate 324 (65 Fe) MG tablet delayed-release EC tablet Take 324 mg by mouth Every Other Day. Night   Yes Salas Garvin MD   lamoTRIgine (LaMICtal) 200 MG tablet Take 200 mg by mouth Every Night. 10/4/12  Yes Emergency, Nurse Epic, RN   Oxybutynin Chloride (DITROPAN PO) Take 10 mg by mouth Every Night.   Yes Emergency, Nurse Jason RN   Cyanocobalamin (VITAMIN B-12 IJ) Inject 1,000 mcg as directed Every 28 (Twenty-Eight) Days.    ProviderSalas MD     Allergies:  Patient has no known allergies.    I have reviewed the patients medical history, surgical history and family history in the available medical record system.     Current Medications:     Current Facility-Administered Medications   Medication Dose Route Frequency Provider Last Rate Last Admin   • cefTRIAXone (ROCEPHIN) 1 g/100 mL 0.9% NS (MBP)  1 g Intravenous Once Christiano Durbin DO       • dextrose 5 % and sodium chloride 0.45 % infusion  30 mL/hr Intravenous Continuous PRN Christiano Durbin DO 30 mL/hr at 12/12/22 1449 30 mL/hr at 12/12/22 1449       Objective     Physical Exam:   Temp:  [97.8 °F (36.6 °C)] 97.8 °F (36.6 °C)  Heart Rate:  [76] 76  Resp:  [18] 18  BP: (124)/(66) 124/66    Physical Exam:  General Appearance:    Alert, cooperative, in no acute distress   Head:    Normocephalic, without obvious abnormality, atraumatic   Eyes:            Lids and lashes normal, conjunctivae and sclerae normal, no icterus, no pallor, corneas clear, PERRLA   Ears:    Ears appear intact with no abnormalities noted   Throat:   No oral lesions, no thrush, oral mucosa moist   Neck:   No adenopathy, supple, trachea midline, no thyromegaly, no  carotid bruit, no JVD   Back:     No kyphosis present, no scoliosis present, no  skin lesions,   erythema or scars, no tenderness to percussion or                 palpation,  range of motion normal   Lungs:     Clear to auscultation,respirations regular, even and         unlabored    Heart:    Regular rhythm and normal rate, normal S1 and S2, no  murmur, no gallop, no rub, no click   Breast Exam:    Deferred   Abdomen:     Normal bowel sounds, no masses, no organomegaly, soft  nontender, nondistended, no guarding, no rebound                 tenderness   Genitalia:    Deferred   Extremities:   Moves all extremities well, no edema, no cyanosis, no          redness   Pulses:   Pulses palpable and equal bilaterally   Skin:   No bleeding, bruising or rash   Lymph nodes:   No palpable adenopathy   Neurologic:   Cranial nerves 2 - 12 grossly intact, sensation intact, DTR     present and equal bilaterally      Results Review:     Lab Results   Component Value Date    WBC 4.3 03/05/2019    WBC 4.1 (L) 08/21/2018    WBC 6.06 02/23/2018    HGB 12.8 03/05/2019    HGB 13.0 08/21/2018    HGB 13.8 02/23/2018    HCT 40.4 03/05/2019    HCT 39.9 08/21/2018    HCT 41.9 02/23/2018     03/05/2019     08/21/2018     02/23/2018             No results found for: LIPASE  No results found for: INR  No results found for: CULTURE    Radiology Review:  Imaging Results (Last 72 Hours)     ** No results found for the last 72 hours. **           I reviewed the patient's new clinical results.  I reviewed the patient's new imaging results and agree with the interpretation.     ASSESSMENT/PLAN:   ASSESSMENT:  1.  Personal history of colon polyps    PLAN:  1.  Colonoscopy    Risk and benefits associated with the procedure are reviewed with the patient.  The patient wished to proceed     Christiano Durbin DO  12/12/22  15:30 CST

## (undated) DEVICE — CANN SMPL SOFTECH BIFLO ETCO2 A/M 7FT

## (undated) DEVICE — SINGLE-USE BIOPSY FORCEPS: Brand: RADIAL JAW 4